# Patient Record
Sex: MALE | Race: ASIAN | NOT HISPANIC OR LATINO | URBAN - METROPOLITAN AREA
[De-identification: names, ages, dates, MRNs, and addresses within clinical notes are randomized per-mention and may not be internally consistent; named-entity substitution may affect disease eponyms.]

---

## 2021-12-18 ENCOUNTER — EMERGENCY (EMERGENCY)
Facility: HOSPITAL | Age: 81
LOS: 1 days | Discharge: HOME | End: 2021-12-18
Attending: EMERGENCY MEDICINE | Admitting: EMERGENCY MEDICINE
Payer: MEDICARE

## 2021-12-18 VITALS
OXYGEN SATURATION: 97 % | SYSTOLIC BLOOD PRESSURE: 149 MMHG | TEMPERATURE: 99 F | DIASTOLIC BLOOD PRESSURE: 96 MMHG | RESPIRATION RATE: 14 BRPM | HEART RATE: 93 BPM

## 2021-12-18 VITALS
HEART RATE: 94 BPM | DIASTOLIC BLOOD PRESSURE: 97 MMHG | SYSTOLIC BLOOD PRESSURE: 170 MMHG | RESPIRATION RATE: 16 BRPM | OXYGEN SATURATION: 96 % | TEMPERATURE: 98 F

## 2021-12-18 LAB
ALBUMIN SERPL ELPH-MCNC: 4 G/DL — SIGNIFICANT CHANGE UP (ref 3.3–5)
ALP SERPL-CCNC: 65 U/L — SIGNIFICANT CHANGE UP (ref 40–120)
ALT FLD-CCNC: 12 U/L — SIGNIFICANT CHANGE UP (ref 4–41)
ANION GAP SERPL CALC-SCNC: 11 MMOL/L — SIGNIFICANT CHANGE UP (ref 7–14)
AST SERPL-CCNC: 16 U/L — SIGNIFICANT CHANGE UP (ref 4–40)
B PERT DNA SPEC QL NAA+PROBE: SIGNIFICANT CHANGE UP
B PERT+PARAPERT DNA PNL SPEC NAA+PROBE: SIGNIFICANT CHANGE UP
BASE EXCESS BLDV CALC-SCNC: -0.3 MMOL/L — SIGNIFICANT CHANGE UP (ref -2–3)
BASOPHILS # BLD AUTO: 0.01 K/UL — SIGNIFICANT CHANGE UP (ref 0–0.2)
BASOPHILS NFR BLD AUTO: 0.2 % — SIGNIFICANT CHANGE UP (ref 0–2)
BILIRUB SERPL-MCNC: 0.5 MG/DL — SIGNIFICANT CHANGE UP (ref 0.2–1.2)
BLOOD GAS VENOUS COMPREHENSIVE RESULT: SIGNIFICANT CHANGE UP
BORDETELLA PARAPERTUSSIS (RAPRVP): SIGNIFICANT CHANGE UP
BUN SERPL-MCNC: 6 MG/DL — LOW (ref 7–23)
C PNEUM DNA SPEC QL NAA+PROBE: SIGNIFICANT CHANGE UP
CALCIUM SERPL-MCNC: 8.8 MG/DL — SIGNIFICANT CHANGE UP (ref 8.4–10.5)
CHLORIDE BLDV-SCNC: 100 MMOL/L — SIGNIFICANT CHANGE UP (ref 96–108)
CHLORIDE SERPL-SCNC: 99 MMOL/L — SIGNIFICANT CHANGE UP (ref 98–107)
CO2 BLDV-SCNC: 26.8 MMOL/L — HIGH (ref 22–26)
CO2 SERPL-SCNC: 23 MMOL/L — SIGNIFICANT CHANGE UP (ref 22–31)
CREAT SERPL-MCNC: 0.85 MG/DL — SIGNIFICANT CHANGE UP (ref 0.5–1.3)
EOSINOPHIL # BLD AUTO: 0.06 K/UL — SIGNIFICANT CHANGE UP (ref 0–0.5)
EOSINOPHIL NFR BLD AUTO: 1.4 % — SIGNIFICANT CHANGE UP (ref 0–6)
FLUAV SUBTYP SPEC NAA+PROBE: SIGNIFICANT CHANGE UP
FLUBV RNA SPEC QL NAA+PROBE: SIGNIFICANT CHANGE UP
GAS PNL BLDV: 131 MMOL/L — LOW (ref 136–145)
GAS PNL BLDV: SIGNIFICANT CHANGE UP
GLUCOSE BLDV-MCNC: 110 MG/DL — HIGH (ref 70–99)
GLUCOSE SERPL-MCNC: 111 MG/DL — HIGH (ref 70–99)
HADV DNA SPEC QL NAA+PROBE: SIGNIFICANT CHANGE UP
HCO3 BLDV-SCNC: 25 MMOL/L — SIGNIFICANT CHANGE UP (ref 22–29)
HCOV 229E RNA SPEC QL NAA+PROBE: SIGNIFICANT CHANGE UP
HCOV HKU1 RNA SPEC QL NAA+PROBE: SIGNIFICANT CHANGE UP
HCOV NL63 RNA SPEC QL NAA+PROBE: SIGNIFICANT CHANGE UP
HCOV OC43 RNA SPEC QL NAA+PROBE: SIGNIFICANT CHANGE UP
HCT VFR BLD CALC: 38.1 % — LOW (ref 39–50)
HCT VFR BLDA CALC: 38 % — LOW (ref 39–51)
HGB BLD CALC-MCNC: 12.5 G/DL — LOW (ref 13–17)
HGB BLD-MCNC: 12.5 G/DL — LOW (ref 13–17)
HMPV RNA SPEC QL NAA+PROBE: SIGNIFICANT CHANGE UP
HPIV1 RNA SPEC QL NAA+PROBE: SIGNIFICANT CHANGE UP
HPIV2 RNA SPEC QL NAA+PROBE: SIGNIFICANT CHANGE UP
HPIV3 RNA SPEC QL NAA+PROBE: SIGNIFICANT CHANGE UP
HPIV4 RNA SPEC QL NAA+PROBE: SIGNIFICANT CHANGE UP
IANC: 2.99 K/UL — SIGNIFICANT CHANGE UP (ref 1.5–8.5)
IMM GRANULOCYTES NFR BLD AUTO: 0.2 % — SIGNIFICANT CHANGE UP (ref 0–1.5)
LACTATE BLDV-MCNC: 1.5 MMOL/L — SIGNIFICANT CHANGE UP (ref 0.5–2)
LYMPHOCYTES # BLD AUTO: 0.73 K/UL — LOW (ref 1–3.3)
LYMPHOCYTES # BLD AUTO: 17 % — SIGNIFICANT CHANGE UP (ref 13–44)
M PNEUMO DNA SPEC QL NAA+PROBE: SIGNIFICANT CHANGE UP
MCHC RBC-ENTMCNC: 21.7 PG — LOW (ref 27–34)
MCHC RBC-ENTMCNC: 32.8 GM/DL — SIGNIFICANT CHANGE UP (ref 32–36)
MCV RBC AUTO: 66.1 FL — LOW (ref 80–100)
MONOCYTES # BLD AUTO: 0.5 K/UL — SIGNIFICANT CHANGE UP (ref 0–0.9)
MONOCYTES NFR BLD AUTO: 11.6 % — SIGNIFICANT CHANGE UP (ref 2–14)
NEUTROPHILS # BLD AUTO: 2.99 K/UL — SIGNIFICANT CHANGE UP (ref 1.8–7.4)
NEUTROPHILS NFR BLD AUTO: 69.6 % — SIGNIFICANT CHANGE UP (ref 43–77)
NRBC # BLD: 0 /100 WBCS — SIGNIFICANT CHANGE UP
NRBC # FLD: 0 K/UL — SIGNIFICANT CHANGE UP
PCO2 BLDV: 45 MMHG — SIGNIFICANT CHANGE UP (ref 42–55)
PH BLDV: 7.36 — SIGNIFICANT CHANGE UP (ref 7.32–7.43)
PLATELET # BLD AUTO: 149 K/UL — LOW (ref 150–400)
PO2 BLDV: 35 MMHG — SIGNIFICANT CHANGE UP
POTASSIUM BLDV-SCNC: 3.3 MMOL/L — LOW (ref 3.5–5.1)
POTASSIUM SERPL-MCNC: 3.3 MMOL/L — LOW (ref 3.5–5.3)
POTASSIUM SERPL-SCNC: 3.3 MMOL/L — LOW (ref 3.5–5.3)
PROT SERPL-MCNC: 7.1 G/DL — SIGNIFICANT CHANGE UP (ref 6–8.3)
RAPID RVP RESULT: SIGNIFICANT CHANGE UP
RBC # BLD: 5.76 M/UL — SIGNIFICANT CHANGE UP (ref 4.2–5.8)
RBC # FLD: 15.9 % — HIGH (ref 10.3–14.5)
RSV RNA SPEC QL NAA+PROBE: SIGNIFICANT CHANGE UP
RV+EV RNA SPEC QL NAA+PROBE: SIGNIFICANT CHANGE UP
SAO2 % BLDV: 55.1 % — SIGNIFICANT CHANGE UP
SARS-COV-2 RNA SPEC QL NAA+PROBE: SIGNIFICANT CHANGE UP
SODIUM SERPL-SCNC: 133 MMOL/L — LOW (ref 135–145)
WBC # BLD: 4.3 K/UL — SIGNIFICANT CHANGE UP (ref 3.8–10.5)
WBC # FLD AUTO: 4.3 K/UL — SIGNIFICANT CHANGE UP (ref 3.8–10.5)

## 2021-12-18 PROCEDURE — 99284 EMERGENCY DEPT VISIT MOD MDM: CPT | Mod: 25

## 2021-12-18 PROCEDURE — 93010 ELECTROCARDIOGRAM REPORT: CPT

## 2021-12-18 RX ORDER — POTASSIUM CHLORIDE 20 MEQ
40 PACKET (EA) ORAL ONCE
Refills: 0 | Status: COMPLETED | OUTPATIENT
Start: 2021-12-18 | End: 2021-12-18

## 2021-12-18 RX ORDER — SODIUM CHLORIDE 9 MG/ML
1000 INJECTION, SOLUTION INTRAVENOUS ONCE
Refills: 0 | Status: COMPLETED | OUTPATIENT
Start: 2021-12-18 | End: 2021-12-18

## 2021-12-18 RX ADMIN — SODIUM CHLORIDE 1000 MILLILITER(S): 9 INJECTION, SOLUTION INTRAVENOUS at 17:44

## 2021-12-18 RX ADMIN — Medication 40 MILLIEQUIVALENT(S): at 19:15

## 2021-12-18 NOTE — ED ADULT TRIAGE NOTE - CHIEF COMPLAINT QUOTE
pt biba son from home , pt arrived 3 days ago from [facility], c/o diarrhea, some vomiting, decreased po intake, decreased sense of taste, dizziness, lightheadedness, denies fever, chills, sob, cough. PMH: HTN, constipation. as per son, pt had witnessed fall at home 2 days ago, denies head trama, blood thinner use.

## 2021-12-18 NOTE — ED ADULT NURSE NOTE - OBJECTIVE STATEMENT
Receive pt. in ER room 23 alert and oriented x 4, presenting to the ER with complaints of diarrhea and vomiting. Pt. is accompanied by his son Nichole who stated " my father came back from [facility] 3 days ago and he has been having diarrhea and vomiting". Labs sent, Lactated Ringers infusing as ordered, will continue to monitor.

## 2021-12-18 NOTE — ED PROVIDER NOTE - CLINICAL SUMMARY MEDICAL DECISION MAKING FREE TEXT BOX
Patient is a 80 y/o M with PMHx of HTN, constipation who presents with diarrhea. He states that he has had 10-15 watery bowel movements starting 3 days ago. He believes that his symptoms started after he ate food from a flight from [facility]. Dizziness likely from hypovolemia.   - Orthostatics   - LR bolus   - CBC, CMP, RVP (Eval for COVID GI presentation)

## 2021-12-18 NOTE — ED PROVIDER NOTE - PATIENT PORTAL LINK FT
You can access the FollowMyHealth Patient Portal offered by [facility] by registering at the following website: [email or web address]. By joining [employer]’s FollowMyHealth portal, you will also be able to view your health information using other applications (apps) compatible with our system.

## 2021-12-18 NOTE — ED PROVIDER NOTE - NSFOLLOWUPINSTRUCTIONS_ED_ALL_ED_FT
1. TAKE ALL MEDICATIONS AS DIRECTED.  FOR PAIN YOU CAN TAKE ACETAMINOPHEN (TYLENOL) AS NEEDED, AS DIRECTED ON PACKAGING.  2. FOLLOW UP WITH primary care doctor AS DIRECTED. YOU WERE GIVEN COPIES OF ALL LABS AND IMAGING RESULTS FROM YOUR ER VISIT--PLEASE TAKE THEM WITH YOU TO YOUR APPOINTMENT.  3. IF NEEDED, CALL [phone] TO FIND A PRIMARY CARE PHYSICIAN.  OR CALL [phone] TO MAKE AN APPOINTMENT WITH THE MEDICAL CLINIC.  4. RETURN TO THE ER FOR ANY WORSENING SYMPTOMS.    Diarrhea    Diarrhea is frequent loose or watery bowel movements that has many causes. Diarrhea can make you feel weak and cause you to become dehydrated. Diarrhea typically lasts 2–3 days, but can last longer if it is a sign of something more serious. Drink clear fluids to prevent dehydration. Eat bland, easy-to-digest foods as tolerated.     SEEK IMMEDIATE MEDICAL CARE IF YOU HAVE ANY OF THE FOLLOWING SYMPTOMS: high fevers, lightheadedness/dizziness, chest pain, black or bloody stools, shortness of breath, severe abdominal or back pain, or any signs of dehydration.

## 2021-12-18 NOTE — ED PROVIDER NOTE - ATTENDING CONTRIBUTION TO CARE
Attending note:   After face to face evaluation of this patient, I concur with above noted hx, pe, and care plan for this patient.  Lopez: 81 yof with HTN, constipation brought to ED by son for three days of diarrhea, over 10 episodes daily with mucus and no blood, no fever or chills, one episode of vomiting, Three days ago pt felt weak and lightheaded upon standing and syncopized with son and no trauma. Pt sick contacts. Recent return from [facility] after two years. Pt ate rice and bananas and water with sugar and salt. Pt is in no distress, BP 160s, HR 70s, not pale, mucus membranes normal, clear lungs, RRR, abd soft and non areas of tn (appears normal distention as per family and patient), no edema, neuro and msk unremarkable.  Diarrhea with weakness likely causing syncope three days ago. - labs, EKG, fluids and reassess, no indication for imaging.

## 2021-12-18 NOTE — ED PROVIDER NOTE - OBJECTIVE STATEMENT
Patient is a 82 y/o M with PMHx of HTN, constipation who presents with diarrhea. He states that he has had 10-15 watery bowel movements starting 3 days ago. He believes that his symptoms started after he ate food from a flight from [facility]. 2 days ago, he was dizzy and had a witnessed fall where he lost consciousness. His son states that he did not hit his head and did not have trauma s/p fall. He denies chest pain, SOB, N/V, abdominal pain,  symptoms. He took imodium yesterday without improvement in his symptoms.

## 2021-12-18 NOTE — ED ADULT NURSE REASSESSMENT NOTE - NS ED NURSE REASSESS COMMENT FT1
Patient received from day RN. Patient resting quietly in bed, breathing even and nonlabored. No acute distress. Patient did not vomit after being PO challenged. Safety maintained. Patient stable upon exiting the room.

## 2021-12-18 NOTE — ED PROVIDER NOTE - PROGRESS NOTE DETAILS
Ashley Wilson DO (PGY1): Potassium repleted. Pt tolerating PO. Pt made aware of lab and imaging results. Questions regarding their symptoms were addressed. Advised to follow up with primary care doctor. Given strict return precautions. Pt verbalized understanding.

## 2021-12-18 NOTE — ED ADULT NURSE NOTE - CHIEF COMPLAINT QUOTE
pt biba son from home , pt arrived 3 days ago from [facility], c/o diarrhea, some vomiting, decreased po intake, decreased sense of taste, dizziness, lightheadedness, denies fever, chills, sob, cough. PMH: HTN, constipation. as per son, pt had witnessed fall at home 2 days ago, denies head trama, blood thinner use.

## 2022-08-03 ENCOUNTER — INPATIENT (INPATIENT)
Facility: HOSPITAL | Age: 82
LOS: 0 days | Discharge: HOME | DRG: 312 | End: 2022-08-04
Attending: STUDENT IN AN ORGANIZED HEALTH CARE EDUCATION/TRAINING PROGRAM | Admitting: STUDENT IN AN ORGANIZED HEALTH CARE EDUCATION/TRAINING PROGRAM
Payer: COMMERCIAL

## 2022-08-03 VITALS
HEART RATE: 82 BPM | DIASTOLIC BLOOD PRESSURE: 79 MMHG | HEIGHT: 64 IN | RESPIRATION RATE: 20 BRPM | OXYGEN SATURATION: 94 % | WEIGHT: 134.92 LBS | TEMPERATURE: 98 F | SYSTOLIC BLOOD PRESSURE: 120 MMHG

## 2022-08-03 DIAGNOSIS — R55 SYNCOPE AND COLLAPSE: ICD-10-CM

## 2022-08-03 DIAGNOSIS — I10 ESSENTIAL (PRIMARY) HYPERTENSION: ICD-10-CM

## 2022-08-03 DIAGNOSIS — Z29.9 ENCOUNTER FOR PROPHYLACTIC MEASURES, UNSPECIFIED: ICD-10-CM

## 2022-08-03 LAB
ALBUMIN SERPL ELPH-MCNC: 3.1 G/DL — LOW (ref 3.5–5)
ALP SERPL-CCNC: 76 U/L — SIGNIFICANT CHANGE UP (ref 40–120)
ALT FLD-CCNC: 16 U/L DA — SIGNIFICANT CHANGE UP (ref 10–60)
ANION GAP SERPL CALC-SCNC: 6 MMOL/L — SIGNIFICANT CHANGE UP (ref 5–17)
ANISOCYTOSIS BLD QL: SLIGHT — SIGNIFICANT CHANGE UP
APPEARANCE UR: CLEAR — SIGNIFICANT CHANGE UP
APTT BLD: 34.7 SEC — SIGNIFICANT CHANGE UP (ref 27.5–35.5)
AST SERPL-CCNC: 12 U/L — SIGNIFICANT CHANGE UP (ref 10–40)
BASOPHILS # BLD AUTO: 0.02 K/UL — SIGNIFICANT CHANGE UP (ref 0–0.2)
BASOPHILS NFR BLD AUTO: 0.3 % — SIGNIFICANT CHANGE UP (ref 0–2)
BILIRUB SERPL-MCNC: 0.6 MG/DL — SIGNIFICANT CHANGE UP (ref 0.2–1.2)
BILIRUB UR-MCNC: NEGATIVE — SIGNIFICANT CHANGE UP
BUN SERPL-MCNC: 14 MG/DL — SIGNIFICANT CHANGE UP (ref 7–18)
CALCIUM SERPL-MCNC: 8.8 MG/DL — SIGNIFICANT CHANGE UP (ref 8.4–10.5)
CHLORIDE SERPL-SCNC: 103 MMOL/L — SIGNIFICANT CHANGE UP (ref 96–108)
CK SERPL-CCNC: 79 U/L — SIGNIFICANT CHANGE UP (ref 35–232)
CO2 SERPL-SCNC: 27 MMOL/L — SIGNIFICANT CHANGE UP (ref 22–31)
COLOR SPEC: YELLOW — SIGNIFICANT CHANGE UP
CREAT SERPL-MCNC: 1.1 MG/DL — SIGNIFICANT CHANGE UP (ref 0.5–1.3)
DIFF PNL FLD: NEGATIVE — SIGNIFICANT CHANGE UP
EGFR: 67 ML/MIN/1.73M2 — SIGNIFICANT CHANGE UP
EOSINOPHIL # BLD AUTO: 0.05 K/UL — SIGNIFICANT CHANGE UP (ref 0–0.5)
EOSINOPHIL NFR BLD AUTO: 0.8 % — SIGNIFICANT CHANGE UP (ref 0–6)
GLUCOSE SERPL-MCNC: 110 MG/DL — HIGH (ref 70–99)
GLUCOSE UR QL: NEGATIVE — SIGNIFICANT CHANGE UP
HCT VFR BLD CALC: 36.2 % — LOW (ref 39–50)
HGB BLD-MCNC: 11.6 G/DL — LOW (ref 13–17)
HIV 1 & 2 AB SERPL IA.RAPID: SIGNIFICANT CHANGE UP
IMM GRANULOCYTES NFR BLD AUTO: 0.5 % — SIGNIFICANT CHANGE UP (ref 0–1.5)
INR BLD: 1.24 RATIO — HIGH (ref 0.88–1.16)
KETONES UR-MCNC: NEGATIVE — SIGNIFICANT CHANGE UP
LEUKOCYTE ESTERASE UR-ACNC: NEGATIVE — SIGNIFICANT CHANGE UP
LIDOCAIN IGE QN: 154 U/L — SIGNIFICANT CHANGE UP (ref 73–393)
LYMPHOCYTES # BLD AUTO: 0.69 K/UL — LOW (ref 1–3.3)
LYMPHOCYTES # BLD AUTO: 11 % — LOW (ref 13–44)
MAGNESIUM SERPL-MCNC: 2.2 MG/DL — SIGNIFICANT CHANGE UP (ref 1.6–2.6)
MANUAL SMEAR VERIFICATION: SIGNIFICANT CHANGE UP
MCHC RBC-ENTMCNC: 21.9 PG — LOW (ref 27–34)
MCHC RBC-ENTMCNC: 32 GM/DL — SIGNIFICANT CHANGE UP (ref 32–36)
MCV RBC AUTO: 68.4 FL — LOW (ref 80–100)
MICROCYTES BLD QL: SLIGHT — SIGNIFICANT CHANGE UP
MONOCYTES # BLD AUTO: 0.46 K/UL — SIGNIFICANT CHANGE UP (ref 0–0.9)
MONOCYTES NFR BLD AUTO: 7.3 % — SIGNIFICANT CHANGE UP (ref 2–14)
NEUTROPHILS # BLD AUTO: 5.02 K/UL — SIGNIFICANT CHANGE UP (ref 1.8–7.4)
NEUTROPHILS NFR BLD AUTO: 80.1 % — HIGH (ref 43–77)
NITRITE UR-MCNC: NEGATIVE — SIGNIFICANT CHANGE UP
NRBC # BLD: 0 /100 WBCS — SIGNIFICANT CHANGE UP (ref 0–0)
NT-PROBNP SERPL-SCNC: 60 PG/ML — SIGNIFICANT CHANGE UP (ref 0–450)
OVALOCYTES BLD QL SMEAR: SLIGHT — SIGNIFICANT CHANGE UP
PH UR: 6.5 — SIGNIFICANT CHANGE UP (ref 5–8)
PLAT MORPH BLD: NORMAL — SIGNIFICANT CHANGE UP
PLATELET # BLD AUTO: 172 K/UL — SIGNIFICANT CHANGE UP (ref 150–400)
POIKILOCYTOSIS BLD QL AUTO: SLIGHT — SIGNIFICANT CHANGE UP
POTASSIUM SERPL-MCNC: 3.5 MMOL/L — SIGNIFICANT CHANGE UP (ref 3.5–5.3)
POTASSIUM SERPL-SCNC: 3.5 MMOL/L — SIGNIFICANT CHANGE UP (ref 3.5–5.3)
PROT SERPL-MCNC: 7 G/DL — SIGNIFICANT CHANGE UP (ref 6–8.3)
PROT UR-MCNC: NEGATIVE — SIGNIFICANT CHANGE UP
PROTHROM AB SERPL-ACNC: 14.8 SEC — HIGH (ref 10.5–13.4)
RBC # BLD: 5.29 M/UL — SIGNIFICANT CHANGE UP (ref 4.2–5.8)
RBC # FLD: 15.5 % — HIGH (ref 10.3–14.5)
RBC BLD AUTO: ABNORMAL
SARS-COV-2 RNA SPEC QL NAA+PROBE: SIGNIFICANT CHANGE UP
SODIUM SERPL-SCNC: 136 MMOL/L — SIGNIFICANT CHANGE UP (ref 135–145)
SP GR SPEC: 1 — LOW (ref 1.01–1.02)
TROPONIN I, HIGH SENSITIVITY RESULT: 8.1 NG/L — SIGNIFICANT CHANGE UP
UROBILINOGEN FLD QL: NEGATIVE — SIGNIFICANT CHANGE UP
WBC # BLD: 6.27 K/UL — SIGNIFICANT CHANGE UP (ref 3.8–10.5)
WBC # FLD AUTO: 6.27 K/UL — SIGNIFICANT CHANGE UP (ref 3.8–10.5)

## 2022-08-03 PROCEDURE — 99223 1ST HOSP IP/OBS HIGH 75: CPT | Mod: GC

## 2022-08-03 PROCEDURE — 99285 EMERGENCY DEPT VISIT HI MDM: CPT

## 2022-08-03 PROCEDURE — 70450 CT HEAD/BRAIN W/O DYE: CPT | Mod: 26,MA

## 2022-08-03 PROCEDURE — 71045 X-RAY EXAM CHEST 1 VIEW: CPT | Mod: 26

## 2022-08-03 RX ORDER — ASPIRIN/CALCIUM CARB/MAGNESIUM 324 MG
162 TABLET ORAL ONCE
Refills: 0 | Status: COMPLETED | OUTPATIENT
Start: 2022-08-03 | End: 2022-08-03

## 2022-08-03 RX ORDER — ASPIRIN/CALCIUM CARB/MAGNESIUM 324 MG
81 TABLET ORAL DAILY
Refills: 0 | Status: DISCONTINUED | OUTPATIENT
Start: 2022-08-03 | End: 2022-08-04

## 2022-08-03 RX ORDER — PANTOPRAZOLE SODIUM 20 MG/1
40 TABLET, DELAYED RELEASE ORAL
Refills: 0 | Status: DISCONTINUED | OUTPATIENT
Start: 2022-08-03 | End: 2022-08-04

## 2022-08-03 RX ORDER — SODIUM CHLORIDE 9 MG/ML
1000 INJECTION INTRAMUSCULAR; INTRAVENOUS; SUBCUTANEOUS
Refills: 0 | Status: DISCONTINUED | OUTPATIENT
Start: 2022-08-03 | End: 2022-08-04

## 2022-08-03 RX ORDER — ACETAMINOPHEN 500 MG
650 TABLET ORAL EVERY 6 HOURS
Refills: 0 | Status: DISCONTINUED | OUTPATIENT
Start: 2022-08-03 | End: 2022-08-04

## 2022-08-03 RX ORDER — FLUTICASONE PROPIONATE 50 MCG
1 SPRAY, SUSPENSION NASAL
Refills: 0 | Status: DISCONTINUED | OUTPATIENT
Start: 2022-08-03 | End: 2022-08-04

## 2022-08-03 RX ORDER — LANOLIN ALCOHOL/MO/W.PET/CERES
3 CREAM (GRAM) TOPICAL AT BEDTIME
Refills: 0 | Status: DISCONTINUED | OUTPATIENT
Start: 2022-08-03 | End: 2022-08-04

## 2022-08-03 RX ORDER — ENOXAPARIN SODIUM 100 MG/ML
40 INJECTION SUBCUTANEOUS EVERY 24 HOURS
Refills: 0 | Status: DISCONTINUED | OUTPATIENT
Start: 2022-08-03 | End: 2022-08-04

## 2022-08-03 RX ORDER — SENNA PLUS 8.6 MG/1
2 TABLET ORAL AT BEDTIME
Refills: 0 | Status: DISCONTINUED | OUTPATIENT
Start: 2022-08-03 | End: 2022-08-04

## 2022-08-03 RX ORDER — CARBAMIDE PEROXIDE 81.86 MG/ML
5 SOLUTION/ DROPS AURICULAR (OTIC)
Refills: 0 | Status: DISCONTINUED | OUTPATIENT
Start: 2022-08-03 | End: 2022-08-04

## 2022-08-03 RX ORDER — ONDANSETRON 8 MG/1
4 TABLET, FILM COATED ORAL EVERY 8 HOURS
Refills: 0 | Status: DISCONTINUED | OUTPATIENT
Start: 2022-08-03 | End: 2022-08-04

## 2022-08-03 RX ADMIN — SENNA PLUS 2 TABLET(S): 8.6 TABLET ORAL at 23:10

## 2022-08-03 RX ADMIN — Medication 1 DROP(S): at 23:10

## 2022-08-03 RX ADMIN — Medication 162 MILLIGRAM(S): at 15:40

## 2022-08-03 NOTE — ED ADULT TRIAGE NOTE - NS ED NURSE AMBULANCES
[facility]

## 2022-08-03 NOTE — ED PROVIDER NOTE - ENMT, MLM
Airway patent, Nasal mucosa clear. Mouth with normal mucosa. Throat has no vesicles, no oropharyngeal exudates and uvula is midline. Airway patent, Nasal mucosa clear. Mouth with normal mucosa. Throat has no vesicles, no oropharyngeal exudates and uvula is midline.  No tongue biting

## 2022-08-03 NOTE — H&P ADULT - NSHPREVIEWOFSYSTEMS_GEN_ALL_CORE
CONSTITUTIONAL: No fever, weight loss, or fatigue  RESPIRATORY: No cough, wheezing, chills or hemoptysis; No shortness of breath  CARDIOVASCULAR: No chest pain, palpitations, dizziness, or leg swelling  GASTROINTESTINAL: No abdominal pain. No nausea, vomiting, or hematemesis; No diarrhea or constipation. No melena or hematochezia.  GENITOURINARY: No dysuria or hematuria, urinary frequency  NEUROLOGICAL: No headaches, memory loss, loss of strength, numbness, or tremors  ENDOCRINE: No polyuria, polydipsia, or heat/cold intolerance  MUSKULOSKELETAL: No muscle aches, joint pains  HEME: no easy bruisability, no tender or enlarged lymph nodes  SKIN: No itching, burning, rashes, or lesions . CONSTITUTIONAL: No fever, weight loss, or fatigue  RESPIRATORY: No cough, wheezing, chills or hemoptysis; No shortness of breath  CARDIOVASCULAR: No chest pain, palpitations, dizziness, or leg swelling  GASTROINTESTINAL: No abdominal pain. No nausea, vomiting, or hematemesis; No diarrhea or constipation. No melena or hematochezia.  GENITOURINARY: No dysuria or hematuria, urinary frequency  NEUROLOGICAL: No headaches, memory loss, loss of strength, numbness, or tremors  ENDOCRINE: No polyuria, polydipsia, or heat/cold intolerance  MUSCULOSKELETAL: No muscle aches, joint pains  HEME: no easy bruisability, no tender or enlarged lymph nodes  SKIN: No itching, burning, rashes, or lesions .

## 2022-08-03 NOTE — ED PROVIDER NOTE - CLINICAL SUMMARY MEDICAL DECISION MAKING FREE TEXT BOX
Patient w/ a few episodes of syncope during stress thallium. Was given theophylline IV by EMS. Will get labs and admission.

## 2022-08-03 NOTE — H&P ADULT - HISTORY OF PRESENT ILLNESS
Patient is a 82 year old  Arabic speaking male with a past medical history of HTN, cataracts, presenting with an episode of syncope today before a cardiac stress test. Patient's son states that the patient had poor PO intake the last 24 hours, did not eat or drink the night before, and reported feeling lightheaded going into the cardiac stress test procedure today. Before the test began, BP was measured and it was low, patient passed out as he was lying on the procedure bed. Once IV fluids were given patient woke back up at baseline. Patient reports that he has had a two month history of chest pain on exertion that resolves with isosorbide mononitrate; hence the reason for the cardiac stress today at his outpatient cardiologist.    Patient endorses feeling lightheaded and fatigued before this event, was laying down at the time of the event. But he denies feeling of warmth, feelings of stress, chest pain, SOB, nausea, vomiting, diarrhea or any pain.    In the ED patient CMP, CBC, troponin negative. Head CT showed no acute abnormalities. EKG showed normal sinus with 1st degree AV block. Patient is a 82 year old  Latvian speaking male with a past medical history of HTN, cataracts, presenting with an episode of syncope today before a cardiac stress test. Patient's son states that the patient had poor PO intake the last 24 hours, did not eat or drink the night before, and reported feeling lightheaded going into the cardiac stress test procedure today. Before the test began, BP was measured and it was low, patient passed out as he was lying on the procedure bed. Once IV fluids were given patient woke back up at baseline. Patient reports that he has had a two month history of chest pain on exertion that resolves with isosorbide mononitrate; hence the reason for the cardiac stress today at his outpatient cardiologist.    Patient endorses feeling lightheaded and fatigued before this event, was laying down at the time of the event. But he denies feeling of warmth, feelings of stress, chest pain, SOB, nausea, vomiting, diarrhea or any pain.    In the ED patient CMP, CBC, troponin negative. Head CT showed no acute abnormalities. EKG showed normal sinus with 1st degree AV block. Patient is a 82 year old  Tajik speaking male with a past medical history of HTN, cataracts, presenting with an episode of syncope today before a cardiac stress test. Patient's son states that the patient had poor PO intake the last 24 hours, did not eat or drink the night before, and reported feeling lightheaded going into the cardiac stress test procedure today. Before the test began, BP was measured and it was low, patient passed out as he was lying on the procedure bed. Once IV fluids were given patient woke back up at baseline. Patient reports that he has had a two month history of chest pain on exertion that resolves with isosorbide mononitrate; hence the reason for the cardiac stress today at his outpatient cardiologist.    Patient endorses feeling lightheaded and fatigued before this event, was laying down at the time of the event. But he denies feeling of warmth, feelings of stress, chest pain, SOB, nausea, vomiting, diarrhea or any pain.    In the ED patient CMP, CBC, troponin negative. Head CT showed no acute abnormalities. EKG showed normal sinus with 1st degree AV block. Patient is a 82 year old  Danish speaking male with a past medical history of HTN, cataracts, presenting with an episode of syncope today before a cardiac stress test without patient Cardiologist Dr. Ian Ayala. Patient's son states that the patient had poor PO intake the last 24 hours, did not eat or drink the night before, and reported feeling lightheaded going into the cardiac stress test procedure today. Before the test began, BP was measured and it was low, patient passed out as he was lying on the procedure bed. Once IV fluids were given patient woke back up at baseline. Patient reports that he has had a two month history of chest pain on exertion that resolves with isosorbide mononitrate; hence the reason for the cardiac stress today at his outpatient cardiologist.    Patient endorses feeling lightheaded and fatigued before this event, was laying down at the time of the event. But he denies feeling of warmth, feelings of stress, chest pain, SOB, nausea, vomiting, diarrhea or any pain.    In the ED patient CMP, CBC, troponin negative. Head CT showed no acute abnormalities. EKG showed normal sinus with 1st degree AV block. Patient is a 82 year old  Maltese speaking male with a past medical history of HTN, cataracts, presenting with an episode of syncope today before a cardiac stress test without patient Cardiologist Dr. Ian Ayala. Patient's son states that the patient had poor PO intake the last 24 hours, did not eat or drink the night before, and reported feeling lightheaded going into the cardiac stress test procedure today. Before the test began, BP was measured and it was low, patient passed out as he was lying on the procedure bed. Once IV fluids were given patient woke back up at baseline. Patient reports that he has had a two month history of chest pain on exertion that resolves with isosorbide mononitrate; hence the reason for the cardiac stress today at his outpatient cardiologist.    Patient endorses feeling lightheaded and fatigued before this event, was laying down at the time of the event. But he denies feeling of warmth, feelings of stress, chest pain, SOB, nausea, vomiting, diarrhea or any pain.    In the ED patient CMP, CBC, troponin negative. Head CT showed no acute abnormalities. EKG showed normal sinus with 1st degree AV block. Patient is a 82 year old  French speaking male with a past medical history of HTN, cataracts, presenting with an episode of syncope today before a cardiac stress test without patient Cardiologist Dr. Ian Ayala. Patient's son states that the patient had poor PO intake the last 24 hours, did not eat or drink the night before, and reported feeling lightheaded going into the cardiac stress test procedure today. Before the test began, BP was measured and it was low, patient passed out as he was lying on the procedure bed. Once IV fluids were given patient woke back up at baseline. Patient reports that he has had a two month history of chest pain on exertion that resolves with isosorbide mononitrate; hence the reason for the cardiac stress today at his outpatient cardiologist.    Patient endorses feeling lightheaded and fatigued before this event, was laying down at the time of the event. But he denies feeling of warmth, feelings of stress, chest pain, SOB, nausea, vomiting, diarrhea or any pain.    In the ED patient CMP, CBC, troponin negative. Head CT showed no acute abnormalities. EKG showed normal sinus with 1st degree AV block.

## 2022-08-03 NOTE — H&P ADULT - ASSESSMENT
Patient is a 82 year old  Portuguese speaking male with a past medical history of HTN, cataracts, presenting with an episode of syncope today before a cardiac stress test concerning for orthostatic vs. cardiogenic syncope. Patient endorses poor PO intake, low BP, feeling lightheaded before the event. Cardiogenic probable given patients history of typical angina, abnormal ECG, though less likely given patient anticipating syncope with feeling lightheaded and poor PO intake, negative troponin.             Patient is a 82 year old  Irish speaking male with a past medical history of HTN, cataracts, presenting with an episode of syncope today before a cardiac stress test concerning for orthostatic vs. cardiogenic syncope. Patient endorses poor PO intake, low BP, feeling lightheaded before the event. Cardiogenic probable given patients history of typical angina, abnormal ECG, though less likely given patient anticipating syncope with feeling lightheaded and poor PO intake, negative troponin.             Patient is a 82 year old  Welsh speaking male with a past medical history of HTN, cataracts, presenting with an episode of syncope today before a cardiac stress test concerning for orthostatic vs. cardiogenic syncope. Patient endorses poor PO intake, low BP, feeling lightheaded before the event. Cardiogenic probable given patients history of typical angina, abnormal ECG, though less likely given patient anticipating syncope with feeling lightheaded and poor PO intake, negative troponin.

## 2022-08-03 NOTE — H&P ADULT - NSHPPHYSICALEXAM_GEN_ALL_CORE
GENERAL: NAD, well-groomed, well-developed  HEAD:  Atraumatic, Normocephalic  EYES: EOMI, PERRLA, conjunctiva and sclera clear  ENMT: No tonsillar erythema, exudates, or enlargement  NECK: Supple, normal appearance, No JVD; Normal thyroid; Trachea midline  NERVOUS SYSTEM:  Alert & Oriented X3,  Motor Strength 5/5 B/L upper and lower extremities, sensation intact  CHEST/LUNG: Lungs clear to auscultation bilaterally, No rales, rhonchi, wheezing   HEART: Regular rate and rhythm; No murmurs, rubs, or gallops  ABDOMEN: Soft, Nontender, Nondistended; Bowel sounds present  EXTREMITIES:  2+ Peripheral Pulses, No clubbing, cyanosis, or edema  LYMPH: No lymphadenopathy noted  SKIN: No rashes or lesions;  Good capillary refill

## 2022-08-03 NOTE — H&P ADULT - PROBLEM SELECTOR PLAN 1
#Syncope  ///  Cardiogenic vs Orthostatic  - History, exam, and labs most consistent with orthostatic syncope given; low BP, poor PO intake, and preceding symptoms  - CT head negative  - EKG 1st Degree Block  - Trop negative  - Ddx: arrhythmogenic vs orthostatic vs ischemic  [ ] Admit to telemetry  [ ] Cardiology consult   [ ] F/U Echo for aortic valve and LVEF evaluation  [ ] F/U orthostatic blood pressure  [ ] Fall prevention  [ ] IVF 100cc/hr #Syncope  ///  Cardiogenic vs Orthostatic  - History, exam, and labs most consistent with orthostatic syncope given; low BP, poor PO intake, and preceding symptoms  - CT head negative  - EKG 1st Degree Block  - Trop negative  - Ddx: arrhythmogenic vs orthostatic vs ischemic  [ ] Admit to telemetry  [ ] Cardiology consult   [ ] F/U Echo for aortic valve and LVEF evaluation  [ ] F/U orthostatic blood pressure  [ ] Fall prevention #Syncope  ///  Cardiogenic vs Orthostatic  - History, exam, and labs most consistent with orthostatic syncope given; low BP, poor PO intake, and preceding symptoms  - CT head negative  - EKG 1st Degree Block  - Trop negative  - Ddx: arrhythmogenic vs orthostatic vs ischemic  [ ] Admit to telemetry  [ ] Cardiology consult: Dr. Pandya  [ ] F/U Echo for aortic valve and LVEF evaluation  [ ] F/U orthostatic blood pressure  [ ] Fall prevention

## 2022-08-03 NOTE — ED ADULT NURSE NOTE - NSIMPLEMENTINTERV_GEN_ALL_ED
Implemented All Fall Risk Interventions:  [place] to call system. Call bell, personal items and telephone within reach. Instruct patient to call for assistance. Room bathroom lighting operational. Non-slip footwear when patient is off stretcher. Physically safe environment: no spills, clutter or unnecessary equipment. Stretcher in lowest position, wheels locked, appropriate side rails in place. Provide visual cue, wrist band, yellow gown, etc. Monitor gait and stability. Monitor for mental status changes and reorient to person, place, and time. Review medications for side effects contributing to fall risk. Reinforce activity limits and safety measures with patient and family. Implemented All Fall Risk Interventions:  [place] to call system. Call bell, personal items and telephone within reach. Instruct patient to call for assistance. Room bathroom lighting operational. Non-slip footwear when patient is off stretcher. Physically safe environment: no spills, clutter or unnecessary equipment. Stretcher in lowest position, wheels locked, appropriate side rails in place. Provide visual cue, wrist band, yellow gown, etc. Monitor gait and stability. Monitor for mental status changes and reorient to person, place, and time. Review medications for side effects contributing to fall risk. Reinforce activity limits and safety measures with patient and family. Implemented All Fall Risk Interventions:  [place] to call system. Call bell, personal items and telephone within reach. Instruct patient to call for assistance. Room bathroom lighting operational. Non-slip footwear when patient is off stretcher. Physically safe environment: no spills, clutter or unnecessary equipment. Stretcher in lowest position, wheels locked, appropriate side rails in place. Provide visual cue, wrist band, yellow gown, etc. Monitor gait and stability. Monitor for mental status changes and reorient to person, place, and time. Review medications for side effects contributing to fall risk. Reinforce activity limits and safety measures with patient and family.

## 2022-08-03 NOTE — ED PROVIDER NOTE - NSICDXPASTMEDICALHX_GEN_ALL_CORE_FT
PAST MEDICAL HISTORY:  H/O constipation     HTN (hypertension)     Hyperlipidemia      PAST MEDICAL HISTORY:  H/O constipation     HTN (hypertension)     Hyperlipidemia       Former smoker

## 2022-08-03 NOTE — H&P ADULT - NSHPSOCIALHISTORY_GEN_ALL_CORE
Lives at home with family, adult children. 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 Lives at home with family, adult children. 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 Lives at home with family, adult children. 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

## 2022-08-03 NOTE — ED ADULT NURSE NOTE - OBJECTIVE STATEMENT
Patient BIB EMS S/P syncopal episode today upon arrival alert and verbally responsive denies chest pain, dizziness, SOB.

## 2022-08-03 NOTE — ED PROVIDER NOTE - OBJECTIVE STATEMENT
81 y/o male, history of hypertension, hyperlipidemia, constipation, former smoker, presents w/ syncope. Had thallium stress test today because of chest pain. When he received blood pressure medicine, his blood pressure went down and he passed out for about a minute. Felt lightheaded and dizzy at the time. Was given 75 mg theophylline by EMS. No nausea, no vomiting, no chest pain, no palpitations, no urinary incontinence, no tongue biting. No known drug allergies. 83 y/o male, history of hypertension, hyperlipidemia, constipation, former smoker, presents w/ syncope. Had thallium stress test today because of chest pain. When he received blood pressure medicine, his blood pressure went down and he passed out for about a minute. Felt lightheaded and dizzy at the time. Was given 75 mg theophylline by EMS. No nausea, no vomiting, no chest pain, no palpitations, no urinary incontinence, no tongue biting. No known drug allergies.

## 2022-08-03 NOTE — H&P ADULT - ATTENDING COMMENTS
82 year old male with PMH of HTN, cataracts, angina presenting with an episode of syncope today before a cardiac stress test. Admit for syncope     concerning for orthostatic vs. cardiogenic syncope. Patient endorses poor PO intake, low BP, feeling lightheaded before the event. Cardiogenic probable given patients history of typical angina, abnormal ECG, though less likely given patient anticipating syncope with feeling lightheaded and poor PO intake, negative troponin. 82 year old male with PMH of HTN, cataracts, angina presenting with an episode of syncope today before a cardiac stress test. Admit for syncope.    #Syncope - orthostatic vs medication induced, vs cardiac  #Angina  #HTN   #Cataracts  Patient reports having poor PO intake prior to stress testing and also took his BP meds and nitro prior to the episode and stress test. Reports that patient was hypotensive at the time. Patient reportedly felt lightheaded at the time and per family he was unresponsive for ~ 1 minute, which resolved with IVF.   - Troponin neg, cxr neg, ekg 1st degree block  - TTE  - Orthostatic BP  - Hold home BP meds - monitor BP  - Telemetry  - Cardiology consult: Dr. Pandya  - CT showing Mild volume loss, microvascular disease. Heterogeneous bilateral caudate head nuclei and left basal ganglia.  - does not appear to be contributing to this event    - f/u outpatient neurology  - DVT ppx

## 2022-08-03 NOTE — H&P ADULT - NSHPLANGPREFER_GEN_A_CORE
[facility]

## 2022-08-04 VITALS
HEART RATE: 75 BPM | TEMPERATURE: 98 F | RESPIRATION RATE: 18 BRPM | OXYGEN SATURATION: 98 % | SYSTOLIC BLOOD PRESSURE: 156 MMHG | DIASTOLIC BLOOD PRESSURE: 77 MMHG

## 2022-08-04 LAB
ANION GAP SERPL CALC-SCNC: 10 MMOL/L — SIGNIFICANT CHANGE UP (ref 5–17)
BUN SERPL-MCNC: 13 MG/DL — SIGNIFICANT CHANGE UP (ref 7–18)
CALCIUM SERPL-MCNC: 9.6 MG/DL — SIGNIFICANT CHANGE UP (ref 8.4–10.5)
CHLORIDE SERPL-SCNC: 104 MMOL/L — SIGNIFICANT CHANGE UP (ref 96–108)
CHOLEST SERPL-MCNC: 110 MG/DL — SIGNIFICANT CHANGE UP
CO2 SERPL-SCNC: 27 MMOL/L — SIGNIFICANT CHANGE UP (ref 22–31)
CREAT SERPL-MCNC: 0.96 MG/DL — SIGNIFICANT CHANGE UP (ref 0.5–1.3)
EGFR: 79 ML/MIN/1.73M2 — SIGNIFICANT CHANGE UP
GLUCOSE SERPL-MCNC: 99 MG/DL — SIGNIFICANT CHANGE UP (ref 70–99)
HCT VFR BLD CALC: 41.3 % — SIGNIFICANT CHANGE UP (ref 39–50)
HDLC SERPL-MCNC: 38 MG/DL — LOW
HGB BLD-MCNC: 13.2 G/DL — SIGNIFICANT CHANGE UP (ref 13–17)
LIPID PNL WITH DIRECT LDL SERPL: 53 MG/DL — SIGNIFICANT CHANGE UP
MAGNESIUM SERPL-MCNC: 2.4 MG/DL — SIGNIFICANT CHANGE UP (ref 1.6–2.6)
MCHC RBC-ENTMCNC: 21.9 PG — LOW (ref 27–34)
MCHC RBC-ENTMCNC: 32 GM/DL — SIGNIFICANT CHANGE UP (ref 32–36)
MCV RBC AUTO: 68.6 FL — LOW (ref 80–100)
NON HDL CHOLESTEROL: 72 MG/DL — SIGNIFICANT CHANGE UP
NRBC # BLD: 0 /100 WBCS — SIGNIFICANT CHANGE UP (ref 0–0)
PHOSPHATE SERPL-MCNC: 3.3 MG/DL — SIGNIFICANT CHANGE UP (ref 2.5–4.5)
PLATELET # BLD AUTO: 205 K/UL — SIGNIFICANT CHANGE UP (ref 150–400)
POTASSIUM SERPL-MCNC: 3.2 MMOL/L — LOW (ref 3.5–5.3)
POTASSIUM SERPL-SCNC: 3.2 MMOL/L — LOW (ref 3.5–5.3)
RBC # BLD: 6.02 M/UL — HIGH (ref 4.2–5.8)
RBC # FLD: 15.9 % — HIGH (ref 10.3–14.5)
SODIUM SERPL-SCNC: 141 MMOL/L — SIGNIFICANT CHANGE UP (ref 135–145)
TRIGL SERPL-MCNC: 97 MG/DL — SIGNIFICANT CHANGE UP
WBC # BLD: 5.95 K/UL — SIGNIFICANT CHANGE UP (ref 3.8–10.5)
WBC # FLD AUTO: 5.95 K/UL — SIGNIFICANT CHANGE UP (ref 3.8–10.5)

## 2022-08-04 PROCEDURE — 80061 LIPID PANEL: CPT

## 2022-08-04 PROCEDURE — 80053 COMPREHEN METABOLIC PANEL: CPT

## 2022-08-04 PROCEDURE — 85025 COMPLETE CBC W/AUTO DIFF WBC: CPT

## 2022-08-04 PROCEDURE — 85610 PROTHROMBIN TIME: CPT

## 2022-08-04 PROCEDURE — 84100 ASSAY OF PHOSPHORUS: CPT

## 2022-08-04 PROCEDURE — 81003 URINALYSIS AUTO W/O SCOPE: CPT

## 2022-08-04 PROCEDURE — 86703 HIV-1/HIV-2 1 RESULT ANTBDY: CPT

## 2022-08-04 PROCEDURE — 70450 CT HEAD/BRAIN W/O DYE: CPT | Mod: MA

## 2022-08-04 PROCEDURE — 36415 COLL VENOUS BLD VENIPUNCTURE: CPT

## 2022-08-04 PROCEDURE — 83880 ASSAY OF NATRIURETIC PEPTIDE: CPT

## 2022-08-04 PROCEDURE — 99285 EMERGENCY DEPT VISIT HI MDM: CPT

## 2022-08-04 PROCEDURE — 94640 AIRWAY INHALATION TREATMENT: CPT

## 2022-08-04 PROCEDURE — 80048 BASIC METABOLIC PNL TOTAL CA: CPT

## 2022-08-04 PROCEDURE — 71045 X-RAY EXAM CHEST 1 VIEW: CPT

## 2022-08-04 PROCEDURE — 93306 TTE W/DOPPLER COMPLETE: CPT

## 2022-08-04 PROCEDURE — 99239 HOSP IP/OBS DSCHRG MGMT >30: CPT

## 2022-08-04 PROCEDURE — 99222 1ST HOSP IP/OBS MODERATE 55: CPT

## 2022-08-04 PROCEDURE — 85730 THROMBOPLASTIN TIME PARTIAL: CPT

## 2022-08-04 PROCEDURE — 87635 SARS-COV-2 COVID-19 AMP PRB: CPT

## 2022-08-04 PROCEDURE — 82550 ASSAY OF CK (CPK): CPT

## 2022-08-04 PROCEDURE — 85027 COMPLETE CBC AUTOMATED: CPT

## 2022-08-04 PROCEDURE — 83735 ASSAY OF MAGNESIUM: CPT

## 2022-08-04 PROCEDURE — 83690 ASSAY OF LIPASE: CPT

## 2022-08-04 PROCEDURE — 84484 ASSAY OF TROPONIN QUANT: CPT

## 2022-08-04 PROCEDURE — 93005 ELECTROCARDIOGRAM TRACING: CPT

## 2022-08-04 RX ORDER — POTASSIUM CHLORIDE 20 MEQ
40 PACKET (EA) ORAL EVERY 4 HOURS
Refills: 0 | Status: COMPLETED | OUTPATIENT
Start: 2022-08-04 | End: 2022-08-04

## 2022-08-04 RX ADMIN — Medication 81 MILLIGRAM(S): at 12:46

## 2022-08-04 RX ADMIN — Medication 1 SPRAY(S): at 18:52

## 2022-08-04 RX ADMIN — Medication 40 MILLIEQUIVALENT(S): at 18:51

## 2022-08-04 RX ADMIN — Medication 1 DROP(S): at 18:52

## 2022-08-04 RX ADMIN — Medication 1 DROP(S): at 12:55

## 2022-08-04 RX ADMIN — Medication 1 DROP(S): at 05:11

## 2022-08-04 RX ADMIN — Medication 1 SPRAY(S): at 05:11

## 2022-08-04 RX ADMIN — CARBAMIDE PEROXIDE 5 DROP(S): 81.86 SOLUTION/ DROPS AURICULAR (OTIC) at 05:10

## 2022-08-04 RX ADMIN — Medication 40 MILLIEQUIVALENT(S): at 12:45

## 2022-08-04 RX ADMIN — PANTOPRAZOLE SODIUM 40 MILLIGRAM(S): 20 TABLET, DELAYED RELEASE ORAL at 05:09

## 2022-08-04 NOTE — CONSULT NOTE ADULT - NSCONSULTADDITIONALINFOA_GEN_ALL_CORE
82-year-old male with hypertension who presented after a near syncopal episode that occurred while patient was undergoing a pharmacologic nuclear stress with dipyridamole.  Review of the stress report, patient developed hypotension towards the end of the exam, and he remembers feeling lightheaded during that time.  Patient adamantly denies losing consciousness, and the test was completed in its entirety.  Patient has no other syncopal episodes.  He currently has no complaints; no chest pain or dyspnea.  His cardiac enzymes are negative.  – Patient either with vagal episode or just symptomatic hypotension in the setting of dipyridamole administration; no need for further inpatient cardiac testing.  Patient should follow-up with his outpatient cardiologist.    Patient is Bulgarian speaking and the information from my HPI was obtained via Bulgarian  [employer] #[id number].  Patient was seen and examined on 8/4/2022.    D/W medicine attending Dr. Merlos. 82-year-old male with hypertension who presented after a near syncopal episode that occurred while patient was undergoing a pharmacologic nuclear stress with dipyridamole.  Review of the stress report, patient developed hypotension towards the end of the exam, and he remembers feeling lightheaded during that time.  Patient adamantly denies losing consciousness, and the test was completed in its entirety.  Patient has no other syncopal episodes.  He currently has no complaints; no chest pain or dyspnea.  His cardiac enzymes are negative.  – Patient either with vagal episode or just symptomatic hypotension in the setting of dipyridamole administration; no need for further inpatient cardiac testing.  Patient should follow-up with his outpatient cardiologist.    Patient is Chinese speaking and the information from my HPI was obtained via Chinese  [employer] #[id number].  Patient was seen and examined on 8/4/2022.    D/W medicine attending Dr. Merlos. 82-year-old male with hypertension who presented after a near syncopal episode that occurred while patient was undergoing a pharmacologic nuclear stress with dipyridamole.  Review of the stress report, patient developed hypotension towards the end of the exam, and he remembers feeling lightheaded during that time.  Patient adamantly denies losing consciousness, and the test was completed in its entirety.  Patient has no other syncopal episodes.  He currently has no complaints; no chest pain or dyspnea.  His cardiac enzymes are negative.  – Patient either with vagal episode or just symptomatic hypotension in the setting of dipyridamole administration; no need for further inpatient cardiac testing.  Patient should follow-up with his outpatient cardiologist.    Patient is Divehi speaking and the information from my HPI was obtained via Divehi  [employer] #[id number].  Patient was seen and examined on 8/4/2022.    D/W medicine attending Dr. Merlos.

## 2022-08-04 NOTE — DISCHARGE NOTE PROVIDER - CARE PROVIDERS DIRECT ADDRESSES
,[email or web address]

## 2022-08-04 NOTE — DISCHARGE NOTE PROVIDER - ATTENDING DISCHARGE PHYSICAL EXAMINATION:
82 year old male with PMH of HTN, cataracts, angina presenting with an episode of syncope today before a cardiac stress test. Admit for syncope 2/2 hypotension due to stress test. Blood pressure improved with IVF and no changes in mental status and no neurologic deficits. Troponin, telemetry negative. CTH showing Mild volume loss, microvascular disease. Heterogeneous bilateral caudate head nuclei and left basal ganglia, which does not appear to be contributing to this event. Patient to follow up outpatient with neurology. Stable for discharge with outpatient follow up.    PHYSICAL EXAM:  GENERAL: NAD  HEENT: Normocephalic;  conjunctivae and sclerae clear; moist mucous membranes;   NECK : Supple  CHEST/LUNG: Clear to auscultation bilaterally with good air entry   HEART: S1 S2  regular; no murmurs, gallops or rubs  ABDOMEN: Soft, Nontender, Nondistended; Bowel sounds present x 4 quad  EXTREMITIES: No cyanosis; no edema; no calf tenderness  SKIN: Warm and dry; no rash  NERVOUS SYSTEM:  Awake and alert; Oriented  to place, person and time ; no new deficits

## 2022-08-04 NOTE — DISCHARGE NOTE NURSING/CASE MANAGEMENT/SOCIAL WORK - PATIENT PORTAL LINK FT
You can access the FollowMyHealth Patient Portal offered by [facility] by registering at the following website: [email or web address]. By joining [employer]’s FollowMyHealth portal, you will also be able to view your health information using other applications (apps) compatible with our system. You can access the FollowMyHealth Patient Portal offered by [facility] by registering at the following website: [email or web address]. By joining [employer]’s FollowMyHealth portal, you will also be able to view your health information using other applications (apps) compatible with our system. You can access the FollowMyHealth Patient Portal offered by [facility] by registering at the following website: [email or web address]. By joining [employer]’s FollowMyHealth portal, you will also be able to view your health information using other applications (apps) compatible with our system.

## 2022-08-04 NOTE — DISCHARGE NOTE PROVIDER - HOSPITAL COURSE
82 year old Greenlandic speaking male with a past medical history of HTN, cataracts.  Presented with an episode of syncope today before a cardiac stress test .  Out patient Cardiologist-Dr. Ian Ayala. Patient's son stated that the patient had poor PO intake the last 24 hours, did not eat or drink the night before, and reported feeling lightheaded while going into the cardiac stress test procedure today. Before the test began, BP was measured and it was low, patient passed out as he was lying on the procedure bed. Once IV fluids were given patient woke back up at baseline. Patient reported he has had a two month history of chest pain on exertion that resolves with Isosorbide mononitrate; hence the reason for the cardiac stress today at his outpatient cardiologist.    Patient endorsed feeling lightheaded and fatigued before this event and was laying down at the time of the event. But denied feeling warm, feelings of stress, chest pain, SOB, nausea, vomiting, diarrhea or any pain.    In the ED patient CMP, CBC, troponin negative. Head CT showed no acute abnormalities. EKG showed normal sinus with 1st degree AV block.      THIS IS A BRIEF SUMMARY.  FOR A FULL HOSPITAL COURSE SEE CHART 82 year old Urdu speaking male with a past medical history of HTN, cataracts.  Presented with an episode of syncope today before a cardiac stress test .  Out patient Cardiologist-Dr. Ian Ayala. Patient's son stated that the patient had poor PO intake the last 24 hours, did not eat or drink the night before, and reported feeling lightheaded while going into the cardiac stress test procedure today. Before the test began, BP was measured and it was low, patient passed out as he was lying on the procedure bed. Once IV fluids were given patient woke back up at baseline. Patient reported he has had a two month history of chest pain on exertion that resolves with Isosorbide mononitrate; hence the reason for the cardiac stress today at his outpatient cardiologist.    Patient endorsed feeling lightheaded and fatigued before this event and was laying down at the time of the event. But denied feeling warm, feelings of stress, chest pain, SOB, nausea, vomiting, diarrhea or any pain.    In the ED patient CMP, CBC, troponin negative. Head CT showed no acute abnormalities. EKG showed normal sinus with 1st degree AV block.      THIS IS A BRIEF SUMMARY.  FOR A FULL HOSPITAL COURSE SEE CHART 82 year old Turkish speaking male with a past medical history of HTN, cataracts.  Presented with an episode of syncope today before a cardiac stress test .  Out patient Cardiologist-Dr. Ian Ayala. Patient's son stated that the patient had poor PO intake the last 24 hours, did not eat or drink the night before, and reported feeling lightheaded while going into the cardiac stress test procedure today. Before the test began, BP was measured and it was low, patient passed out as he was lying on the procedure bed. Once IV fluids were given patient woke back up at baseline. Patient reported he has had a two month history of chest pain on exertion that resolves with Isosorbide mononitrate; hence the reason for the cardiac stress today at his outpatient cardiologist.    Patient endorsed feeling lightheaded and fatigued before this event and was laying down at the time of the event. But denied feeling warm, feelings of stress, chest pain, SOB, nausea, vomiting, diarrhea or any pain.    In the ED patient CMP, CBC, troponin negative. Head CT showed no acute abnormalities. EKG showed normal sinus with 1st degree AV block.      THIS IS A BRIEF SUMMARY.  FOR A FULL HOSPITAL COURSE SEE CHART 82 year old Hungarian speaking male with a past medical history of HTN, cataracts.  Presented with an episode of syncope today before a cardiac stress test .  Out patient Cardiologist-Dr. Ian Ayala. Patient's son stated that the patient had poor PO intake the last 24 hours, did not eat or drink the night before, and reported feeling lightheaded while going into the cardiac stress test procedure today. Before the test began, BP was measured and it was low, patient passed out as he was lying on the procedure bed. Once IV fluids were given patient woke back up at baseline. Patient reported he has had a two month history of chest pain on exertion that resolves with Isosorbide mononitrate; hence the reason for the cardiac stress today at his outpatient cardiologist.    Patient endorsed feeling lightheaded and fatigued before this event and was laying down at the time of the event. But denied feeling warm, feelings of stress, chest pain, SOB, nausea, vomiting, diarrhea or any pain.    In the ED patient CMP, CBC, troponin negative. Head CT showed no acute abnormalities. EKG showed normal sinus with 1st degree AV block.    CTH showing Mild volume loss, microvascular disease. Heterogeneous bilateral caudate head nuclei and left basal ganglia, which does not appear to be contributing to this event. Patient to follow up outpatient with PCP/neurology.     THIS IS A BRIEF SUMMARY.  FOR A FULL HOSPITAL COURSE SEE CHART 82 year old Faroese speaking male with a past medical history of HTN, cataracts.  Presented with an episode of syncope today before a cardiac stress test .  Out patient Cardiologist-Dr. Ian Ayala. Patient's son stated that the patient had poor PO intake the last 24 hours, did not eat or drink the night before, and reported feeling lightheaded while going into the cardiac stress test procedure today. Before the test began, BP was measured and it was low, patient passed out as he was lying on the procedure bed. Once IV fluids were given patient woke back up at baseline. Patient reported he has had a two month history of chest pain on exertion that resolves with Isosorbide mononitrate; hence the reason for the cardiac stress today at his outpatient cardiologist.    Patient endorsed feeling lightheaded and fatigued before this event and was laying down at the time of the event. But denied feeling warm, feelings of stress, chest pain, SOB, nausea, vomiting, diarrhea or any pain.    In the ED patient CMP, CBC, troponin negative. Head CT showed no acute abnormalities. EKG showed normal sinus with 1st degree AV block.    CTH showing Mild volume loss, microvascular disease. Heterogeneous bilateral caudate head nuclei and left basal ganglia, which does not appear to be contributing to this event. Patient to follow up outpatient with PCP/neurology.     THIS IS A BRIEF SUMMARY.  FOR A FULL HOSPITAL COURSE SEE CHART 82 year old Belarusian speaking male with a past medical history of HTN, cataracts.  Presented with an episode of syncope today before a cardiac stress test .  Out patient Cardiologist-Dr. Ian Ayala. Patient's son stated that the patient had poor PO intake the last 24 hours, did not eat or drink the night before, and reported feeling lightheaded while going into the cardiac stress test procedure today. Before the test began, BP was measured and it was low, patient passed out as he was lying on the procedure bed. Once IV fluids were given patient woke back up at baseline. Patient reported he has had a two month history of chest pain on exertion that resolves with Isosorbide mononitrate; hence the reason for the cardiac stress today at his outpatient cardiologist.    Patient endorsed feeling lightheaded and fatigued before this event and was laying down at the time of the event. But denied feeling warm, feelings of stress, chest pain, SOB, nausea, vomiting, diarrhea or any pain.    In the ED patient CMP, CBC, troponin negative. Head CT showed no acute abnormalities. EKG showed normal sinus with 1st degree AV block.    CTH showing Mild volume loss, microvascular disease. Heterogeneous bilateral caudate head nuclei and left basal ganglia, which does not appear to be contributing to this event. Patient to follow up outpatient with PCP/neurology.     THIS IS A BRIEF SUMMARY.  FOR A FULL HOSPITAL COURSE SEE CHART

## 2022-08-04 NOTE — DISCHARGE NOTE NURSING/CASE MANAGEMENT/SOCIAL WORK - NSDCPEFALRISK_GEN_ALL_CORE
For information on Fall & Injury Prevention, visit: [email or web address]-[email or web address] OR  [email or web address]/[email or web address]-[email or web address]-tips-[email or web address]-avoid-injury OR  https://www.cdc.gov/steadi/patient.html For information on Fall & Injury Prevention, visit: [email or web address]-[email or web address]-mobility OR  [email or web address]://[email or web address]-tips-[email or web address]-injury OR  https://www.cdc.gov/steadi/patient.html For information on Fall & Injury Prevention, visit: [email or web address]://[email or web address].[email or web address]/news/fall-prevention-protects-[email or web address]-maintains-health-[email or web address]-mobility OR  https://[email or web address].[email or web address]/news/fall-prevention-tips-[email or web address]-injury OR  https://www.cdc.gov/steadi/patient.html

## 2022-08-04 NOTE — CONSULT NOTE ADULT - SUBJECTIVE AND OBJECTIVE BOX
CHIEF COMPLAINT:  Syncope    HPI:  Patient is a 82 year old  Welsh speaking male with a past medical history of CAD, HTN, cataracts, presenting with an questionable episode of syncope during persantine stress test. According to the patient, he did not loose consciousness.  Patient endorses feeling lightheaded and fatigued before this event, was laying down at the time of the event. As per outpatient cardiologist's note, patient have been having intermittent chest pain on exertion for which he was sent for ischemia work up.  According to records from cardiac diagnostic patient completed the stress test during which his bp dropped  Patient denies chest pain, palpitations, shortness of breath, LE edema, PND/orthopnea.      PAST MEDICAL & SURGICAL HISTORY:  HTN (hypertension)  CAD  H/O constipation  Former smoker  No significant past surgical history    Allergies    No Known Allergies    Intolerances        MEDICATIONS  (STANDING):  artificial  tears Solution 1 Drop(s) Both EYES every 6 hours  aspirin  chewable 81 milliGRAM(s) Oral daily  carbamide peroxide Otic Solution 5 Drop(s) Both Ears two times a day  enoxaparin Injectable 40 milliGRAM(s) SubCutaneous every 24 hours  fluticasone propionate 50 MICROgram(s)/spray Nasal Spray 1 Spray(s) Both Nostrils two times a day  pantoprazole    Tablet 40 milliGRAM(s) Oral before breakfast  potassium chloride    Tablet ER 40 milliEquivalent(s) Oral every 4 hours  senna 2 Tablet(s) Oral at bedtime  sodium chloride 0.9%. 1000 milliLiter(s) (60 mL/Hr) IV Continuous <Continuous>    MEDICATIONS  (PRN):  acetaminophen     Tablet .. 650 milliGRAM(s) Oral every 6 hours PRN Temp greater or equal to 38C (100.4F), Mild Pain (1 - 3), Moderate Pain (4 - 6)  aluminum hydroxide/magnesium hydroxide/simethicone Suspension 30 milliLiter(s) Oral every 4 hours PRN Dyspepsia  melatonin 3 milliGRAM(s) Oral at bedtime PRN Insomnia  ondansetron Injectable 4 milliGRAM(s) IV Push every 8 hours PRN Nausea and/or Vomiting      FAMILY HISTORY:  No pertinent family history in first degree relatives        ***No family history of premature coronary artery disease or sudden cardiac death    SOCIAL HISTORY:  Smoking-former smoker  Alcohol-denies  Illicit Drug use-denies    REVIEW OF SYSTEMS:  Constitutional: [ ] fever, [ ]weight loss,  [ x]fatigue  Eyes: [ ] visual changes  Respiratory: [ ]shortness of breath;  [ ] cough, [ ]wheezing, [ ]chills, [ ]hemoptysis  Cardiovascular: [x ] chest pain, [ ]palpitations, [ ]dizziness,  [ ]leg swelling [ ]syncope  Gastrointestinal: [ ] abdominal pain, [ ]nausea, [ ]vomiting,  [ ]diarrhea   Genitourinary: [ ] dysuria, [ ] hematuria  Neurologic: [ ] headaches [ ] tremors  [ ] weakness [ x] lightheadedness  Skin: [ ] itching, [ ]burning, [ ] rashes  Endocrine: [ ] heat or cold intolerance  Musculoskeletal: [ ] joint pain or swelling; [ ] muscle, back, or extremity pain  Psychiatric: [ ] depression, [ ]anxiety, [ ]mood swings, or [ ]difficulty sleeping  Hematologic: [ ] easy bruising, [ ] bleeding gums     [ x] All others negative	  [ ] Unable to obtain    Vital Signs Last 24 Hrs  T(C): 36.5 (04 Aug 2022 11:15), Max: 36.7 (04 Aug 2022 00:19)  T(F): 97.7 (04 Aug 2022 11:15), Max: 98 (04 Aug 2022 00:19)  HR: 65 (04 Aug 2022 11:15) (65 - 66)  BP: 120/77 (04 Aug 2022 11:15) (113/70 - 127/54)  BP(mean): --  RR: 18 (04 Aug 2022 11:15) (17 - 18)  SpO2: 97% (04 Aug 2022 11:15) (96% - 97%)    Parameters below as of 04 Aug 2022 11:15  Patient On (Oxygen Delivery Method): room air      I&O's Summary      PHYSICAL EXAM:  General: No acute distress  HEENT: EOMI  Neck:  No JVD  Lungs: Clear to auscultation bilaterally; No rales or wheezing  Heart: Regular rate and rhythm; No murmurs, rubs, or gallops  Abdomen: soft, non tender, non distended   Extremities: warm, no edema   Nervous system:  Alert & Oriented X3  Psychiatric: Normal affect  Skin: No rashes or lesions    LABS:  08-04    141  |  104  |  13  ----------------------------<  99  3.2<L>   |  27  |  0.96    Ca    9.6      04 Aug 2022 06:15  Phos  3.3     08-04  Mg     2.4     08-04    TPro  7.0  /  Alb  3.1<L>  /  TBili  0.6  /  DBili  x   /  AST  12  /  ALT  16  /  AlkPhos  76  08-03    Creatinine Trend: 0.96<--, 1.10<--                        13.2   5.95  )-----------( 205      ( 04 Aug 2022 06:15 )             41.3     PT/INR - ( 03 Aug 2022 13:38 )   PT: 14.8 sec;   INR: 1.24 ratio         PTT - ( 03 Aug 2022 13:38 )  PTT:34.7 sec    Lipid Panel: Cholesterol, Serum 110  Direct LDL --  HDL Cholesterol, Serum 38  Triglycerides, Serum 97    Cardiac Enzymes: CARDIAC MARKERS ( 03 Aug 2022 13:38 )  x     / x     / 79 U/L / x     / x          Serum Pro-Brain Natriuretic Peptide: 60 pg/mL (08-03-22 @ 13:38)        RADIOLOGY:  < from: Xray Chest 1 View- PORTABLE-Urgent (08.03.22 @ 17:38) >  IMPRESSION:  Clear lungs.    < end of copied text >  < from: CT Head No Cont (08.03.22 @ 15:15) >  HEAD CT: Mild volume loss, microvascular disease, no acute hemorrhage or   midline shift.  Heterogeneous bilateral caudate head nuclei and left basal ganglia. MRI   may provide helpful additional evaluation, if clinically indicated.    < end of copied text >      ECG [my interpretation]:  8/3/2022  13:30p    NSR with 1degree AV block HR 72     TELEMETRY:  NSR HR range 50-80     CHIEF COMPLAINT:  Syncope    HPI:  Patient is a 82 year old  Persian speaking male with a past medical history of CAD, HTN, cataracts, presenting with an questionable episode of syncope during persantine stress test. According to the patient, he did not loose consciousness.  Patient endorses feeling lightheaded and fatigued before this event, was laying down at the time of the event. As per outpatient cardiologist's note, patient have been having intermittent chest pain on exertion for which he was sent for ischemia work up.  According to records from cardiac diagnostic patient completed the stress test during which his bp dropped  Patient denies chest pain, palpitations, shortness of breath, LE edema, PND/orthopnea.      PAST MEDICAL & SURGICAL HISTORY:  HTN (hypertension)  CAD  H/O constipation  Former smoker  No significant past surgical history    Allergies    No Known Allergies    Intolerances        MEDICATIONS  (STANDING):  artificial  tears Solution 1 Drop(s) Both EYES every 6 hours  aspirin  chewable 81 milliGRAM(s) Oral daily  carbamide peroxide Otic Solution 5 Drop(s) Both Ears two times a day  enoxaparin Injectable 40 milliGRAM(s) SubCutaneous every 24 hours  fluticasone propionate 50 MICROgram(s)/spray Nasal Spray 1 Spray(s) Both Nostrils two times a day  pantoprazole    Tablet 40 milliGRAM(s) Oral before breakfast  potassium chloride    Tablet ER 40 milliEquivalent(s) Oral every 4 hours  senna 2 Tablet(s) Oral at bedtime  sodium chloride 0.9%. 1000 milliLiter(s) (60 mL/Hr) IV Continuous <Continuous>    MEDICATIONS  (PRN):  acetaminophen     Tablet .. 650 milliGRAM(s) Oral every 6 hours PRN Temp greater or equal to 38C (100.4F), Mild Pain (1 - 3), Moderate Pain (4 - 6)  aluminum hydroxide/magnesium hydroxide/simethicone Suspension 30 milliLiter(s) Oral every 4 hours PRN Dyspepsia  melatonin 3 milliGRAM(s) Oral at bedtime PRN Insomnia  ondansetron Injectable 4 milliGRAM(s) IV Push every 8 hours PRN Nausea and/or Vomiting      FAMILY HISTORY:  No pertinent family history in first degree relatives        ***No family history of premature coronary artery disease or sudden cardiac death    SOCIAL HISTORY:  Smoking-former smoker  Alcohol-denies  Illicit Drug use-denies    REVIEW OF SYSTEMS:  Constitutional: [ ] fever, [ ]weight loss,  [ x]fatigue  Eyes: [ ] visual changes  Respiratory: [ ]shortness of breath;  [ ] cough, [ ]wheezing, [ ]chills, [ ]hemoptysis  Cardiovascular: [x ] chest pain, [ ]palpitations, [ ]dizziness,  [ ]leg swelling [ ]syncope  Gastrointestinal: [ ] abdominal pain, [ ]nausea, [ ]vomiting,  [ ]diarrhea   Genitourinary: [ ] dysuria, [ ] hematuria  Neurologic: [ ] headaches [ ] tremors  [ ] weakness [ x] lightheadedness  Skin: [ ] itching, [ ]burning, [ ] rashes  Endocrine: [ ] heat or cold intolerance  Musculoskeletal: [ ] joint pain or swelling; [ ] muscle, back, or extremity pain  Psychiatric: [ ] depression, [ ]anxiety, [ ]mood swings, or [ ]difficulty sleeping  Hematologic: [ ] easy bruising, [ ] bleeding gums     [ x] All others negative	  [ ] Unable to obtain    Vital Signs Last 24 Hrs  T(C): 36.5 (04 Aug 2022 11:15), Max: 36.7 (04 Aug 2022 00:19)  T(F): 97.7 (04 Aug 2022 11:15), Max: 98 (04 Aug 2022 00:19)  HR: 65 (04 Aug 2022 11:15) (65 - 66)  BP: 120/77 (04 Aug 2022 11:15) (113/70 - 127/54)  BP(mean): --  RR: 18 (04 Aug 2022 11:15) (17 - 18)  SpO2: 97% (04 Aug 2022 11:15) (96% - 97%)    Parameters below as of 04 Aug 2022 11:15  Patient On (Oxygen Delivery Method): room air      I&O's Summary      PHYSICAL EXAM:  General: No acute distress  HEENT: EOMI  Neck:  No JVD  Lungs: Clear to auscultation bilaterally; No rales or wheezing  Heart: Regular rate and rhythm; No murmurs, rubs, or gallops  Abdomen: soft, non tender, non distended   Extremities: warm, no edema   Nervous system:  Alert & Oriented X3  Psychiatric: Normal affect  Skin: No rashes or lesions    LABS:  08-04    141  |  104  |  13  ----------------------------<  99  3.2<L>   |  27  |  0.96    Ca    9.6      04 Aug 2022 06:15  Phos  3.3     08-04  Mg     2.4     08-04    TPro  7.0  /  Alb  3.1<L>  /  TBili  0.6  /  DBili  x   /  AST  12  /  ALT  16  /  AlkPhos  76  08-03    Creatinine Trend: 0.96<--, 1.10<--                        13.2   5.95  )-----------( 205      ( 04 Aug 2022 06:15 )             41.3     PT/INR - ( 03 Aug 2022 13:38 )   PT: 14.8 sec;   INR: 1.24 ratio         PTT - ( 03 Aug 2022 13:38 )  PTT:34.7 sec    Lipid Panel: Cholesterol, Serum 110  Direct LDL --  HDL Cholesterol, Serum 38  Triglycerides, Serum 97    Cardiac Enzymes: CARDIAC MARKERS ( 03 Aug 2022 13:38 )  x     / x     / 79 U/L / x     / x          Serum Pro-Brain Natriuretic Peptide: 60 pg/mL (08-03-22 @ 13:38)        RADIOLOGY:  < from: Xray Chest 1 View- PORTABLE-Urgent (08.03.22 @ 17:38) >  IMPRESSION:  Clear lungs.    < end of copied text >  < from: CT Head No Cont (08.03.22 @ 15:15) >  HEAD CT: Mild volume loss, microvascular disease, no acute hemorrhage or   midline shift.  Heterogeneous bilateral caudate head nuclei and left basal ganglia. MRI   may provide helpful additional evaluation, if clinically indicated.    < end of copied text >      ECG [my interpretation]:  8/3/2022  13:30p    NSR with 1degree AV block HR 72     TELEMETRY:  NSR HR range 50-80     CHIEF COMPLAINT:  Syncope    HPI:  Patient is a 82 year old  Syriac speaking male with a past medical history of CAD, HTN, cataracts, presenting with an questionable episode of syncope during persantine stress test. According to the patient, he did not loose consciousness.  Patient endorses feeling lightheaded and fatigued before this event, was laying down at the time of the event. As per outpatient cardiologist's note, patient have been having intermittent chest pain on exertion for which he was sent for ischemia work up.  According to records from cardiac diagnostic patient completed the stress test during which his bp dropped  Patient denies chest pain, palpitations, shortness of breath, LE edema, PND/orthopnea.      PAST MEDICAL & SURGICAL HISTORY:  HTN (hypertension)  CAD  H/O constipation  Former smoker  No significant past surgical history    Allergies    No Known Allergies    Intolerances        MEDICATIONS  (STANDING):  artificial  tears Solution 1 Drop(s) Both EYES every 6 hours  aspirin  chewable 81 milliGRAM(s) Oral daily  carbamide peroxide Otic Solution 5 Drop(s) Both Ears two times a day  enoxaparin Injectable 40 milliGRAM(s) SubCutaneous every 24 hours  fluticasone propionate 50 MICROgram(s)/spray Nasal Spray 1 Spray(s) Both Nostrils two times a day  pantoprazole    Tablet 40 milliGRAM(s) Oral before breakfast  potassium chloride    Tablet ER 40 milliEquivalent(s) Oral every 4 hours  senna 2 Tablet(s) Oral at bedtime  sodium chloride 0.9%. 1000 milliLiter(s) (60 mL/Hr) IV Continuous <Continuous>    MEDICATIONS  (PRN):  acetaminophen     Tablet .. 650 milliGRAM(s) Oral every 6 hours PRN Temp greater or equal to 38C (100.4F), Mild Pain (1 - 3), Moderate Pain (4 - 6)  aluminum hydroxide/magnesium hydroxide/simethicone Suspension 30 milliLiter(s) Oral every 4 hours PRN Dyspepsia  melatonin 3 milliGRAM(s) Oral at bedtime PRN Insomnia  ondansetron Injectable 4 milliGRAM(s) IV Push every 8 hours PRN Nausea and/or Vomiting      FAMILY HISTORY:  No pertinent family history in first degree relatives        ***No family history of premature coronary artery disease or sudden cardiac death    SOCIAL HISTORY:  Smoking-former smoker  Alcohol-denies  Illicit Drug use-denies    REVIEW OF SYSTEMS:  Constitutional: [ ] fever, [ ]weight loss,  [ x]fatigue  Eyes: [ ] visual changes  Respiratory: [ ]shortness of breath;  [ ] cough, [ ]wheezing, [ ]chills, [ ]hemoptysis  Cardiovascular: [x ] chest pain, [ ]palpitations, [ ]dizziness,  [ ]leg swelling [ ]syncope  Gastrointestinal: [ ] abdominal pain, [ ]nausea, [ ]vomiting,  [ ]diarrhea   Genitourinary: [ ] dysuria, [ ] hematuria  Neurologic: [ ] headaches [ ] tremors  [ ] weakness [ x] lightheadedness  Skin: [ ] itching, [ ]burning, [ ] rashes  Endocrine: [ ] heat or cold intolerance  Musculoskeletal: [ ] joint pain or swelling; [ ] muscle, back, or extremity pain  Psychiatric: [ ] depression, [ ]anxiety, [ ]mood swings, or [ ]difficulty sleeping  Hematologic: [ ] easy bruising, [ ] bleeding gums     [ x] All others negative	  [ ] Unable to obtain    Vital Signs Last 24 Hrs  T(C): 36.5 (04 Aug 2022 11:15), Max: 36.7 (04 Aug 2022 00:19)  T(F): 97.7 (04 Aug 2022 11:15), Max: 98 (04 Aug 2022 00:19)  HR: 65 (04 Aug 2022 11:15) (65 - 66)  BP: 120/77 (04 Aug 2022 11:15) (113/70 - 127/54)  BP(mean): --  RR: 18 (04 Aug 2022 11:15) (17 - 18)  SpO2: 97% (04 Aug 2022 11:15) (96% - 97%)    Parameters below as of 04 Aug 2022 11:15  Patient On (Oxygen Delivery Method): room air      I&O's Summary      PHYSICAL EXAM:  General: No acute distress  HEENT: EOMI  Neck:  No JVD  Lungs: Clear to auscultation bilaterally; No rales or wheezing  Heart: Regular rate and rhythm; No murmurs, rubs, or gallops  Abdomen: soft, non tender, non distended   Extremities: warm, no edema   Nervous system:  Alert & Oriented X3  Psychiatric: Normal affect  Skin: No rashes or lesions    LABS:  08-04    141  |  104  |  13  ----------------------------<  99  3.2<L>   |  27  |  0.96    Ca    9.6      04 Aug 2022 06:15  Phos  3.3     08-04  Mg     2.4     08-04    TPro  7.0  /  Alb  3.1<L>  /  TBili  0.6  /  DBili  x   /  AST  12  /  ALT  16  /  AlkPhos  76  08-03    Creatinine Trend: 0.96<--, 1.10<--                        13.2   5.95  )-----------( 205      ( 04 Aug 2022 06:15 )             41.3     PT/INR - ( 03 Aug 2022 13:38 )   PT: 14.8 sec;   INR: 1.24 ratio         PTT - ( 03 Aug 2022 13:38 )  PTT:34.7 sec    Lipid Panel: Cholesterol, Serum 110  Direct LDL --  HDL Cholesterol, Serum 38  Triglycerides, Serum 97    Cardiac Enzymes: CARDIAC MARKERS ( 03 Aug 2022 13:38 )  x     / x     / 79 U/L / x     / x          Serum Pro-Brain Natriuretic Peptide: 60 pg/mL (08-03-22 @ 13:38)        RADIOLOGY:  < from: Xray Chest 1 View- PORTABLE-Urgent (08.03.22 @ 17:38) >  IMPRESSION:  Clear lungs.    < end of copied text >  < from: CT Head No Cont (08.03.22 @ 15:15) >  HEAD CT: Mild volume loss, microvascular disease, no acute hemorrhage or   midline shift.  Heterogeneous bilateral caudate head nuclei and left basal ganglia. MRI   may provide helpful additional evaluation, if clinically indicated.    < end of copied text >      ECG [my interpretation]:  8/3/2022  13:30p    NSR with 1degree AV block HR 72     TELEMETRY:  NSR HR range 50-80

## 2022-08-04 NOTE — DISCHARGE NOTE PROVIDER - NSDCMRMEDTOKEN_GEN_ALL_CORE_FT
aspirin 81 mg oral tablet, chewable: 1 tab(s) orally once a day  Carbamide Peroxide 6.5% otic solution: 5 drop(s) to each affected ear 2 times a day  fluticasone 50 mcg/inh inhalation powder: 1 puff(s) inhaled 2 times a day  hydroCHLOROthiazide 12.5 mg oral tablet: 1 tab(s) orally once a day  omeprazole 20 mg oral delayed release tablet: 1 tab(s) orally once a day  Senna 8.6 mg oral tablet: 1 tab(s) orally once a day (at bedtime)  Systane ophthalmic gel forming solution: 1 drop(s) to each affected eye 4 times a day

## 2022-08-04 NOTE — PROGRESS NOTE ADULT - PROBLEM SELECTOR PLAN 2
Continue to monitor BP  C/w Home med HTCZ  Currently not on HTCZ d/t normotension  Continue to monitor BP and restart accordingly

## 2022-08-04 NOTE — PROGRESS NOTE ADULT - SUBJECTIVE AND OBJECTIVE BOX
NP Note discussed with  primary attending    Patient is a 82y old  Male who presents with a chief complaint of Syncope (03 Aug 2022 16:51)      INTERVAL HPI/OVERNIGHT EVENTS: Pt denies HA, dizziness, or any complaints.  [facility] #[id number].    MEDICATIONS  (STANDING):  artificial  tears Solution 1 Drop(s) Both EYES every 6 hours  aspirin  chewable 81 milliGRAM(s) Oral daily  carbamide peroxide Otic Solution 5 Drop(s) Both Ears two times a day  enoxaparin Injectable 40 milliGRAM(s) SubCutaneous every 24 hours  fluticasone propionate 50 MICROgram(s)/spray Nasal Spray 1 Spray(s) Both Nostrils two times a day  pantoprazole    Tablet 40 milliGRAM(s) Oral before breakfast  potassium chloride    Tablet ER 40 milliEquivalent(s) Oral every 4 hours  senna 2 Tablet(s) Oral at bedtime  sodium chloride 0.9%. 1000 milliLiter(s) (60 mL/Hr) IV Continuous <Continuous>    MEDICATIONS  (PRN):  acetaminophen     Tablet .. 650 milliGRAM(s) Oral every 6 hours PRN Temp greater or equal to 38C (100.4F), Mild Pain (1 - 3), Moderate Pain (4 - 6)  aluminum hydroxide/magnesium hydroxide/simethicone Suspension 30 milliLiter(s) Oral every 4 hours PRN Dyspepsia  melatonin 3 milliGRAM(s) Oral at bedtime PRN Insomnia  ondansetron Injectable 4 milliGRAM(s) IV Push every 8 hours PRN Nausea and/or Vomiting      __________________________________________________  REVIEW OF SYSTEMS:    CONSTITUTIONAL: No fever  EYES: No acute visual disturbances  NECK: No pain or stiffness  RESPIRATORY: No cough; No shortness of breath  CARDIOVASCULAR: No chest pain, no palpitations  GASTROINTESTINAL: No pain. No nausea or vomiting.  No diarrhea   NEUROLOGICAL: No headache or numbness, no tremors  MUSCULOSKELETAL: No joint pain, no muscle pain  GENITOURINARY: No dysuria, no frequency, no hesitancy  PSYCHIATRY: No depression , no anxiety  ALL OTHER  ROS negative        Vital Signs Last 24 Hrs  T(C): 36.5 (04 Aug 2022 11:15), Max: 36.7 (04 Aug 2022 00:19)  T(F): 97.7 (04 Aug 2022 11:15), Max: 98 (04 Aug 2022 00:19)  HR: 65 (04 Aug 2022 11:15) (65 - 75)  BP: 120/77 (04 Aug 2022 11:15) (113/70 - 128/73)  RR: 18 (04 Aug 2022 11:15) (17 - 18)  SpO2: 97% (04 Aug 2022 11:15) (96% - 100%)    Parameters below as of 04 Aug 2022 11:15  Patient On (Oxygen Delivery Method): room air        ________________________________________________  PHYSICAL EXAM:  GENERAL: NAD  HEENT: Normocephalic;  conjunctivae and sclerae clear; moist mucous membranes;   NECK : Supple  CHEST/LUNG: Clear to auscultation bilaterally with good air entry   HEART: S1 S2  regular; no murmurs, gallops or rubs  ABDOMEN: Soft, Nontender, Nondistended; Bowel sounds present x 4 quad  EXTREMITIES: No cyanosis; no edema; no calf tenderness  SKIN: Warm and dry; no rash  NERVOUS SYSTEM:  Awake and alert; Oriented  to place, person and time ; no new deficits    _________________________________________________  LABS:                        13.2   5.95  )-----------( 205      ( 04 Aug 2022 06:15 )             41.3     08-04    141  |  104  |  13  ----------------------------<  99  3.2<L>   |  27  |  0.96    Ca    9.6      04 Aug 2022 06:15  Phos  3.3     08-04  Mg     2.4     08-04    TPro  7.0  /  Alb  3.1<L>  /  TBili  0.6  /  DBili  x   /  AST  12  /  ALT  16  /  AlkPhos  76  08-03    PT/INR - ( 03 Aug 2022 13:38 )   PT: 14.8 sec;   INR: 1.24 ratio         PTT - ( 03 Aug 2022 13:38 )  PTT:34.7 sec  Urinalysis Basic - ( 03 Aug 2022 20:10 )    Color: Yellow / Appearance: Clear / S.005 / pH: x  Gluc: x / Ketone: Negative  / Bili: Negative / Urobili: Negative   Blood: x / Protein: Negative / Nitrite: Negative   Leuk Esterase: Negative / RBC: x / WBC x   Sq Epi: x / Non Sq Epi: x / Bacteria: x      CAPILLARY BLOOD GLUCOSE            RADIOLOGY & ADDITIONAL TESTS:    Imaging Personally Reviewed:  YES    Consultant(s) Notes Reviewed:   YES  Care Discussed with Consultants :     Plan of care was discussed with patient and /or primary care giver; all questions and concerns were addressed and care was aligned with patient's wishes.     NP Note discussed with  primary attending    Patient is a 82y old  Male who presents with a chief complaint of Syncope (03 Aug 2022 16:51)      INTERVAL HPI/OVERNIGHT EVENTS: Pt denies HA, dizziness, or any complaints.  [facility] #[id number].    MEDICATIONS  (STANDING):  artificial  tears Solution 1 Drop(s) Both EYES every 6 hours  aspirin  chewable 81 milliGRAM(s) Oral daily  carbamide peroxide Otic Solution 5 Drop(s) Both Ears two times a day  enoxaparin Injectable 40 milliGRAM(s) SubCutaneous every 24 hours  fluticasone propionate 50 MICROgram(s)/spray Nasal Spray 1 Spray(s) Both Nostrils two times a day  pantoprazole    Tablet 40 milliGRAM(s) Oral before breakfast  potassium chloride    Tablet ER 40 milliEquivalent(s) Oral every 4 hours  senna 2 Tablet(s) Oral at bedtime  sodium chloride 0.9%. 1000 milliLiter(s) (60 mL/Hr) IV Continuous <Continuous>    MEDICATIONS  (PRN):  acetaminophen     Tablet .. 650 milliGRAM(s) Oral every 6 hours PRN Temp greater or equal to 38C (100.4F), Mild Pain (1 - 3), Moderate Pain (4 - 6)  aluminum hydroxide/magnesium hydroxide/simethicone Suspension 30 milliLiter(s) Oral every 4 hours PRN Dyspepsia  melatonin 3 milliGRAM(s) Oral at bedtime PRN Insomnia  ondansetron Injectable 4 milliGRAM(s) IV Push every 8 hours PRN Nausea and/or Vomiting      __________________________________________________  REVIEW OF SYSTEMS:    CONSTITUTIONAL: No fever  EYES: No acute visual disturbances  NECK: No pain or stiffness  RESPIRATORY: No cough; No shortness of breath  CARDIOVASCULAR: No chest pain, no palpitations  GASTROINTESTINAL: No pain. No nausea or vomiting.  No diarrhea   NEUROLOGICAL: No headache or numbness, no tremors  MUSCULOSKELETAL: No joint pain, no muscle pain  GENITOURINARY: No dysuria, no frequency, no hesitancy  PSYCHIATRY: No depression , no anxiety  ALL OTHER  ROS negative        Vital Signs Last 24 Hrs  T(C): 36.5 (04 Aug 2022 11:15), Max: 36.7 (04 Aug 2022 00:19)  T(F): 97.7 (04 Aug 2022 11:15), Max: 98 (04 Aug 2022 00:19)  HR: 65 (04 Aug 2022 11:15) (65 - 75)  BP: 120/77 (04 Aug 2022 11:15) (113/70 - 128/73)  RR: 18 (04 Aug 2022 11:15) (17 - 18)  SpO2: 97% (04 Aug 2022 11:15) (96% - 100%)    Parameters below as of 04 Aug 2022 11:15  Patient On (Oxygen Delivery Method): room air        ________________________________________________  PHYSICAL EXAM:  GENERAL: NAD  HEENT: Normocephalic;  conjunctivae and sclerae clear; moist mucous membranes;   NECK : Supple  CHEST/LUNG: Clear to auscultation bilaterally with good air entry   HEART: S1 S2  regular; no murmurs, gallops or rubs  ABDOMEN: Soft, Nontender, Nondistended; Bowel sounds present x 4 quad  EXTREMITIES: No cyanosis; no edema; no calf tenderness  SKIN: Warm and dry; no rash  NERVOUS SYSTEM:  Awake and alert; Oriented  to place, person and time ; no new deficits    _________________________________________________  LABS:                        13.2   5.95  )-----------( 205      ( 04 Aug 2022 06:15 )             41.3     08-04    141  |  104  |  13  ----------------------------<  99  3.2<L>   |  27  |  0.96    Ca    9.6      04 Aug 2022 06:15  Phos  3.3     08-04  Mg     2.4     08-04    TPro  7.0  /  Alb  3.1<L>  /  TBili  0.6  /  DBili  x   /  AST  12  /  ALT  16  /  AlkPhos  76  08-03    PT/INR - ( 03 Aug 2022 13:38 )   PT: 14.8 sec;   INR: 1.24 ratio         PTT - ( 03 Aug 2022 13:38 )  PTT:34.7 sec  Urinalysis Basic - ( 03 Aug 2022 20:10 )    Color: Yellow / Appearance: Clear / S.005 / pH: x  Gluc: x / Ketone: Negative  / Bili: Negative / Urobili: Negative   Blood: x / Protein: Negative / Nitrite: Negative   Leuk Esterase: Negative / RBC: x / WBC x   Sq Epi: x / Non Sq Epi: x / Bacteria: x      CAPILLARY BLOOD GLUCOSE            RADIOLOGY & ADDITIONAL TESTS:    Imaging Personally Reviewed:  YES    Consultant(s) Notes Reviewed:   YES  Care Discussed with Consultants :     Plan of care was discussed with patient and /or primary care giver; all questions and concerns were addressed and care was aligned with patient's wishes.     NP Note discussed with  primary attending    Patient is a 82y old  Male who presents with a chief complaint of Syncope (03 Aug 2022 16:51)      INTERVAL HPI/OVERNIGHT EVENTS: Pt denies HA, dizziness, or any complaints.  [facility] #[id number].    MEDICATIONS  (STANDING):  artificial  tears Solution 1 Drop(s) Both EYES every 6 hours  aspirin  chewable 81 milliGRAM(s) Oral daily  carbamide peroxide Otic Solution 5 Drop(s) Both Ears two times a day  enoxaparin Injectable 40 milliGRAM(s) SubCutaneous every 24 hours  fluticasone propionate 50 MICROgram(s)/spray Nasal Spray 1 Spray(s) Both Nostrils two times a day  pantoprazole    Tablet 40 milliGRAM(s) Oral before breakfast  potassium chloride    Tablet ER 40 milliEquivalent(s) Oral every 4 hours  senna 2 Tablet(s) Oral at bedtime  sodium chloride 0.9%. 1000 milliLiter(s) (60 mL/Hr) IV Continuous <Continuous>    MEDICATIONS  (PRN):  acetaminophen     Tablet .. 650 milliGRAM(s) Oral every 6 hours PRN Temp greater or equal to 38C (100.4F), Mild Pain (1 - 3), Moderate Pain (4 - 6)  aluminum hydroxide/magnesium hydroxide/simethicone Suspension 30 milliLiter(s) Oral every 4 hours PRN Dyspepsia  melatonin 3 milliGRAM(s) Oral at bedtime PRN Insomnia  ondansetron Injectable 4 milliGRAM(s) IV Push every 8 hours PRN Nausea and/or Vomiting      __________________________________________________  REVIEW OF SYSTEMS:    CONSTITUTIONAL: No fever  EYES: No acute visual disturbances  NECK: No pain or stiffness  RESPIRATORY: No cough; No shortness of breath  CARDIOVASCULAR: No chest pain, no palpitations  GASTROINTESTINAL: No pain. No nausea or vomiting.  No diarrhea   NEUROLOGICAL: No headache or numbness, no tremors  MUSCULOSKELETAL: No joint pain, no muscle pain  GENITOURINARY: No dysuria, no frequency, no hesitancy  PSYCHIATRY: No depression , no anxiety  ALL OTHER  ROS negative        Vital Signs Last 24 Hrs  T(C): 36.5 (04 Aug 2022 11:15), Max: 36.7 (04 Aug 2022 00:19)  T(F): 97.7 (04 Aug 2022 11:15), Max: 98 (04 Aug 2022 00:19)  HR: 65 (04 Aug 2022 11:15) (65 - 75)  BP: 120/77 (04 Aug 2022 11:15) (113/70 - 128/73)  RR: 18 (04 Aug 2022 11:15) (17 - 18)  SpO2: 97% (04 Aug 2022 11:15) (96% - 100%)    Parameters below as of 04 Aug 2022 11:15  Patient On (Oxygen Delivery Method): room air        ________________________________________________  PHYSICAL EXAM:  GENERAL: NAD  HEENT: Normocephalic;  conjunctivae and sclerae clear; moist mucous membranes;   NECK : Supple  CHEST/LUNG: Clear to auscultation bilaterally with good air entry   HEART: S1 S2  regular; no murmurs, gallops or rubs  ABDOMEN: Soft, Nontender, Nondistended; Bowel sounds present x 4 quad  EXTREMITIES: No cyanosis; no edema; no calf tenderness  SKIN: Warm and dry; no rash  NERVOUS SYSTEM:  Awake and alert; Oriented  to place, person and time ; no new deficits    _________________________________________________  LABS:                        13.2   5.95  )-----------( 205      ( 04 Aug 2022 06:15 )             41.3     08-04    141  |  104  |  13  ----------------------------<  99  3.2<L>   |  27  |  0.96    Ca    9.6      04 Aug 2022 06:15  Phos  3.3     08-04  Mg     2.4     08-04    TPro  7.0  /  Alb  3.1<L>  /  TBili  0.6  /  DBili  x   /  AST  12  /  ALT  16  /  AlkPhos  76  08-03    PT/INR - ( 03 Aug 2022 13:38 )   PT: 14.8 sec;   INR: 1.24 ratio         PTT - ( 03 Aug 2022 13:38 )  PTT:34.7 sec  Urinalysis Basic - ( 03 Aug 2022 20:10 )    Color: Yellow / Appearance: Clear / S.005 / pH: x  Gluc: x / Ketone: Negative  / Bili: Negative / Urobili: Negative   Blood: x / Protein: Negative / Nitrite: Negative   Leuk Esterase: Negative / RBC: x / WBC x   Sq Epi: x / Non Sq Epi: x / Bacteria: x      CAPILLARY BLOOD GLUCOSE            RADIOLOGY & ADDITIONAL TESTS:    Imaging Personally Reviewed:  YES    Consultant(s) Notes Reviewed:   YES  Care Discussed with Consultants :     Plan of care was discussed with patient and /or primary care giver; all questions and concerns were addressed and care was aligned with patient's wishes.

## 2022-08-04 NOTE — DISCHARGE NOTE PROVIDER - CARE PROVIDER_API CALL
[facility]  [facility]  [id number] [place] [address]  Phone: [phone]  Fax: [phone])-  Follow Up Time:    [facility]  [id number] [place] [address]  Phone: ()-  Fax: ()-  Follow Up Time:    [facility]  [id number] [place] [address]  Phone: ([phone]  Fax: ()-  Follow Up Time:

## 2022-08-04 NOTE — PROGRESS NOTE ADULT - REASON FOR ADMISSION
We have renewed your  heart medications have been renewed for 1 year    Tests that we are ordering for you and the reason for them:        Have your blood drawn two days before your next visit [No Follow-up on file.] for:        Medication changes that we discussed today and other instructions:          Your cholesterol lowering statin and/or ezetimibe does NOT need to be taken at night regardless of what the pharmacy instructs: You may take this in the morning if it is more convenient for you. This includes: Atorvastatin (Lipitor) Ezetimbe (Zetia) Rosuvastatin (Crestor) Simvastatin (Zocor) Pravastatin (Pravachol) Vytorin     If you take aspirin use a CHEWABLE baby aspirin - avoid COATED aspirin, It is often not absorbed    If you have high blood pressure or if I ask you to bring in blood pressure readings before your next visit:   · Please WRITE DOWN 10 to 15 blood pressure readings on EACH of TWO or THREE days and bring to me at your next visit.   · Don't worry  if occasional readings of the top number (\"systolic\") are greater than 140. Do not worry if occasional readings of the bottom number (\"diastolic\") are greater than 90 (or if numbers seem \"low\" and you feel fine). I am looking for the trend, not single numbers!  · It is helpful for your primary doctor for you to record 10-15 readings on the first day of each month (or any other day easy for you to remember) and bring them to appointments. If this one day has good readings, you don't need to take your \"pressure\" every day!!  · IF MORE THAN ONE-THIRD (1/3) OF THE \"TOP\" NUMBERS ARE GREATER THAN 140/ OR MORE THAN ONE-QUARTER (1/4) OF \"BOTTOM\" NUMBERS ARE GREATER THAN /90 CALL FOR APPOINTMENT OR FOR MEDICATION ADJUSTMENT BY TELEPHONE    If you have a stress test or echo or other imaging study before your next visit and the result is significantly abnormal or urgent, we will call (or use [facility]), otherwise we will discuss The results at your next visit.    If  you receive a survey from [employer] or [employer] in the the mail, please fill it out!    Alok Salguero MD FACC  President, [place] Chapter, [employer]   Syncope

## 2022-08-04 NOTE — DISCHARGE NOTE PROVIDER - PROVIDER TOKENS
PROVIDER:[TOKEN:[[id number]]] PROVIDER:[TOKEN:[[id number]]] PROVIDER:[TOKEN:[[id number]]]

## 2022-08-04 NOTE — CONSULT NOTE ADULT - PROBLEM SELECTOR RECOMMENDATION 9
-most likely in the setting persantine infusion  -patient feels better, no events on telemetry  -no need for additional cardiac testing  -patient to follow up with outpatient cardiologist for stress test results

## 2022-08-04 NOTE — CONSULT NOTE ADULT - ASSESSMENT
Patient is a 82 year old  Kinyarwanda speaking male with a past medical history of CAD, HTN, cataracts, presenting with an questionable episode of syncope during persantine stress test. According to the patient, he did not loose consciousness.  Patient endorses feeling lightheaded and fatigued before this event, was laying down at the time of the event. As per outpatient cardiologist's note, patient have been having intermittent chest pain on exertion for which he was sent for ischemia work up.  According to records from cardiac diagnostic patient completed the stress test during which his bp dropped  Patient denies chest pain, palpitations, shortness of breath, LE edema, PND/orthopnea.  Cardiology was consulted   Patient is a 82 year old  Romanian speaking male with a past medical history of CAD, HTN, cataracts, presenting with an questionable episode of syncope during persantine stress test. According to the patient, he did not loose consciousness.  Patient endorses feeling lightheaded and fatigued before this event, was laying down at the time of the event. As per outpatient cardiologist's note, patient have been having intermittent chest pain on exertion for which he was sent for ischemia work up.  According to records from cardiac diagnostic patient completed the stress test during which his bp dropped  Patient denies chest pain, palpitations, shortness of breath, LE edema, PND/orthopnea.  Cardiology was consulted   Patient is a 82 year old  Estonian speaking male with a past medical history of CAD, HTN, cataracts, presenting with an questionable episode of syncope during persantine stress test. According to the patient, he did not loose consciousness.  Patient endorses feeling lightheaded and fatigued before this event, was laying down at the time of the event. As per outpatient cardiologist's note, patient have been having intermittent chest pain on exertion for which he was sent for ischemia work up.  According to records from cardiac diagnostic patient completed the stress test during which his bp dropped  Patient denies chest pain, palpitations, shortness of breath, LE edema, PND/orthopnea.  Cardiology was consulted

## 2022-08-04 NOTE — DISCHARGE NOTE PROVIDER - NSDCCPCAREPLAN_GEN_ALL_CORE_FT
PRINCIPAL DISCHARGE DIAGNOSIS  Diagnosis: Syncope  Assessment and Plan of Treatment: You presented to the hospital for syncope.  You were monitored on telemetry and no cardiac events were seen.  Your blood pressure was monitored in different positions and it remained normal.  Please follow up with your PCP and Cardiologist within one week.      SECONDARY DISCHARGE DIAGNOSES  Diagnosis: HTN (hypertension)  Assessment and Plan of Treatment: You have a history of high blood pressure.  Please resume taking your medications at home for your high blood pressure.  Eat a healthy diet.  Your diet should be low in saturated fat and trans fat.  Be sure to include fruits and vegetables into your healthy diet.  Its important to remain physically active.  You have high blood pressure therefore its important to control your blood pressure.  You've been prescribed medication to help control your blood pressure.  Please take your blood pressure medication as prescribed.  If your blood pressure is not controlled it could lead to a heart attack, heart failure, kidney disease or stroke.

## 2022-08-04 NOTE — PATIENT PROFILE ADULT - FALL HARM RISK - HARM RISK INTERVENTIONS
Assistance with ambulation/Assistance OOB with selected safe patient handling equipment/Communicate Risk of Fall with Harm to all staff/Discuss with provider need for PT consult/Monitor gait and stability/Orthostatic vital signs/Provide patient with walking aids - walker, cane, crutches/Reinforce activity limits and safety measures with patient and family/Tailored Fall Risk Interventions/Visual Cue: Yellow wristband and red socks/Bed in lowest position, wheels locked, appropriate side rails in place/Call bell, personal items and telephone in reach/Instruct patient to call for assistance before getting out of bed or chair/Non-slip footwear when patient is out of bed/[place] to call system/Physically safe environment - no spills, clutter or unnecessary equipment/Purposeful Proactive Rounding/Room/bathroom lighting operational, light cord in reach Assistance with ambulation/Assistance OOB with selected safe patient handling equipment/Communicate Risk of Fall with Harm to all staff/Discuss with provider need for PT consult/Monitor gait and stability/Orthostatic vital signs/Provide patient with walking aids - walker, cane, crutches/Reinforce activity limits and safety measures with patient and family/Tailored Fall Risk Interventions/Visual Cue: Yellow wristband and red socks/Bed in lowest position, wheels locked, appropriate side rails in place/Call bell, personal items and telephone in reach/Instruct patient to call for assistance before getting out of bed or chair/Non-slip footwear when patient is out of bed/[place] to call system/Physically safe environment - no spills, clutter or unnecessary equipment/Purposeful Proactive Rounding/Room/bathroom lighting operational, light cord in reach Assistance with ambulation/Assistance OOB with selected safe patient handling equipment/Communicate Risk of Fall with Harm to all staff/Discuss with provider need for PT consult/Monitor gait and stability/Orthostatic vital signs/Provide patient with walking aids - walker, cane, crutches/Reinforce activity limits and safety measures with patient and family/Tailored Fall Risk Interventions/Visual Cue: Yellow wristband and red socks/Bed in lowest position, wheels locked, appropriate side rails in place/Call bell, personal items and telephone in reach/Instruct patient to call for assistance before getting out of bed or chair/Non-slip footwear when patient is out of bed/[place] to call system/Physically safe environment - no spills, clutter or unnecessary equipment/Purposeful Proactive Rounding/Room/bathroom lighting operational, light cord in reach

## 2022-08-04 NOTE — CONSULT NOTE ADULT - PROBLEM SELECTOR RECOMMENDATION 2
- Stable  - DASH/TLC  - BP goal of  < 150/90 (age > 60)  -c/w home meds w/ holding parameters  - monitor BP & titrate BP meds PRN

## 2022-08-04 NOTE — CHART NOTE - NSCHARTNOTEFT_GEN_A_CORE
"SERTRALINE 100MG TABLETS      Last Written Prescription Date:  9/19/18  Last Fill Quantity: 90,   # refills: 3  Last Office Visit: 6/5/19  Future Office visit:       Requested Prescriptions   Pending Prescriptions Disp Refills     sertraline (ZOLOFT) 100 MG tablet [Pharmacy Med Name: SERTRALINE 100MG TABLETS] 90 tablet 0     Sig: TAKE 1 TABLET(100 MG) BY MOUTH DAILY       SSRIs Protocol Failed - 12/17/2019  8:23 AM        Failed - PHQ-9 score less than 5 in past 6 months     Please review last PHQ-9 score.           Passed - Medication is active on med list        Passed - Patient is age 18 or older        Passed - No active pregnancy on record        Passed - No positive pregnancy test in last 12 months        Passed - Recent (6 mo) or future (30 days) visit within the authorizing provider's specialty     Patient had office visit in the last 6 months or has a visit in the next 30 days with authorizing provider or within the authorizing provider's specialty.  See \"Patient Info\" tab in inbasket, or \"Choose Columns\" in Meds & Orders section of the refill encounter.              " NP rec'vd msg pt's dtr called.  NP called dtr-Maxim # [phone] and discussed pt's care.  Drtr informed NP that pt follows w/ [facility]Dr. Ayala. NP rec'vd msg pt's dtr called.  NP called dtr-Maxim # [phone] and discussed pt's care.  Drtr informed NP that pt follows w/ [facility] Ayala. NP rec'vd msg pt's dtr called.  NP called dtr-Maxim # [phone] and discussed pt's care.  Drtr informed NP that pt follows w/ [facility]Dr. Ayala.

## 2022-08-04 NOTE — PROGRESS NOTE ADULT - PROBLEM SELECTOR PLAN 1
#Syncope  ///  Cardiogenic vs Orthostatic  - History, exam, and labs most consistent with orthostatic syncope given; low BP, poor PO intake, and preceding symptoms  - CT head negative  - EKG 1st Degree Block  - Trop negative  - Ddx: arrhythmogenic vs orthostatic vs ischemic  Maintain fall precautions   Admit to telemetry  Orthostatics negative   Cardiology-Dr. Pandya consulted, appreciated

## 2022-08-04 NOTE — PROGRESS NOTE ADULT - ASSESSMENT
82 year old Serbian speaking male with a past medical history of HTN, cataracts.   Presented with an episode of syncope today before a cardiac stress test concerning for orthostatic vs. cardiogenic syncope.  Admitted for cardiac workup.       82 year old Romanian speaking male with a past medical history of HTN, cataracts.   Presented with an episode of syncope today before a cardiac stress test concerning for orthostatic vs. cardiogenic syncope.  Admitted for cardiac workup.       82 year old [place]ian speaking male with a past medical history of HTN, cataracts.   Presented with an episode of syncope today before a cardiac stress test concerning for orthostatic vs. cardiogenic syncope.  Admitted for cardiac workup.

## 2023-01-17 NOTE — ED PROVIDER NOTE - TIMING
Date Of Surgery - Today Or Tomorrow?: today Risk Assessment Explanation (Does Not Render In The Note): Clinical determination of the probability and/or consequences of an event, such as surgery. Clinical assessment of the level of risk is affected by the nature of the event under consideration for the patient. Modifier 57 is used to indicate an Evaluation and Management (E/M) service resulted in the initial decision to perform surgery either the day before a major surgery (90 day global) or the day of a major surgery. Initial Decision For Surgery?: Yes Complexity (Necessary For Coding; Major - 90 Day Global With Some Exceptions; Minor - 10 Day Global): major Discussion: Decision for surgery refers to any surgeries performed today, or discussion of treatment in the future. In general, decision for repair is not made until the final extent of the surgical wound is known. gradual onset

## 2023-12-04 PROBLEM — K59.00 CONSTIPATION, UNSPECIFIED: Chronic | Status: ACTIVE | Noted: 2021-12-18

## 2023-12-04 PROBLEM — I10 ESSENTIAL (PRIMARY) HYPERTENSION: Chronic | Status: ACTIVE | Noted: 2021-12-18

## 2023-12-04 RX ORDER — ASPIRIN/CALCIUM CARB/MAGNESIUM 324 MG
1 TABLET ORAL
Qty: 0 | Refills: 0 | DISCHARGE

## 2023-12-04 RX ORDER — OMEPRAZOLE 10 MG/1
1 CAPSULE, DELAYED RELEASE ORAL
Qty: 0 | Refills: 0 | DISCHARGE

## 2023-12-04 RX ORDER — FLUTICASONE PROPIONATE 220 MCG
1 AEROSOL WITH ADAPTER (GRAM) INHALATION
Qty: 0 | Refills: 0 | DISCHARGE

## 2023-12-04 RX ORDER — CARBAMIDE PEROXIDE 81.86 MG/ML
5 SOLUTION/ DROPS AURICULAR (OTIC)
Qty: 0 | Refills: 0 | DISCHARGE

## 2023-12-04 RX ORDER — HYDROCHLOROTHIAZIDE 25 MG
1 TABLET ORAL
Qty: 0 | Refills: 0 | DISCHARGE

## 2023-12-04 RX ORDER — SENNA PLUS 8.6 MG/1
1 TABLET ORAL
Qty: 0 | Refills: 0 | DISCHARGE